# Patient Record
Sex: MALE | Race: WHITE | NOT HISPANIC OR LATINO | Employment: UNEMPLOYED | URBAN - METROPOLITAN AREA
[De-identification: names, ages, dates, MRNs, and addresses within clinical notes are randomized per-mention and may not be internally consistent; named-entity substitution may affect disease eponyms.]

---

## 2022-01-11 ENCOUNTER — HOSPITAL ENCOUNTER (INPATIENT)
Facility: HOSPITAL | Age: 42
LOS: 5 days | Discharge: HOME | End: 2022-01-19
Attending: EMERGENCY MEDICINE | Admitting: INTERNAL MEDICINE
Payer: MEDICAID

## 2022-01-11 DIAGNOSIS — L89.94 PRESSURE INJURY, STAGE 4, WITH INFECTION: Primary | ICD-10-CM

## 2022-01-11 DIAGNOSIS — L08.9 PRESSURE INJURY, STAGE 4, WITH INFECTION: Primary | ICD-10-CM

## 2022-01-11 DIAGNOSIS — M25.511 RIGHT SHOULDER PAIN, UNSPECIFIED CHRONICITY: ICD-10-CM

## 2022-01-11 DIAGNOSIS — L89.90 INFECTED DECUBITUS ULCER, UNSPECIFIED ULCER STAGE: ICD-10-CM

## 2022-01-11 DIAGNOSIS — L08.9 INFECTED DECUBITUS ULCER, UNSPECIFIED ULCER STAGE: ICD-10-CM

## 2022-01-11 LAB
ALBUMIN SERPL-MCNC: 2.9 G/DL (ref 3.5–5.2)
ALBUMIN/GLOB SERPL: 0.8 G/DL
ALP SERPL-CCNC: 74 U/L (ref 39–117)
ALT SERPL W P-5'-P-CCNC: 16 U/L (ref 1–41)
ANION GAP SERPL CALCULATED.3IONS-SCNC: 10 MMOL/L (ref 5–15)
AST SERPL-CCNC: 8 U/L (ref 1–40)
BILIRUB SERPL-MCNC: 0.2 MG/DL (ref 0–1.2)
BUN SERPL-MCNC: 20 MG/DL (ref 6–20)
BUN/CREAT SERPL: 30.3 (ref 7–25)
CALCIUM SPEC-SCNC: 9.6 MG/DL (ref 8.6–10.5)
CHLORIDE SERPL-SCNC: 101 MMOL/L (ref 98–107)
CO2 SERPL-SCNC: 26 MMOL/L (ref 22–29)
CREAT SERPL-MCNC: 0.66 MG/DL (ref 0.76–1.27)
DEPRECATED RDW RBC AUTO: 40.3 FL (ref 37–54)
EOSINOPHIL # BLD MANUAL: 0.22 10*3/MM3 (ref 0–0.4)
EOSINOPHIL NFR BLD MANUAL: 4 % (ref 0.3–6.2)
ERYTHROCYTE [DISTWIDTH] IN BLOOD BY AUTOMATED COUNT: 12.8 % (ref 12.3–15.4)
GFR SERPL CREATININE-BSD FRML MDRD: 133 ML/MIN/1.73
GLOBULIN UR ELPH-MCNC: 3.5 GM/DL
GLUCOSE SERPL-MCNC: 195 MG/DL (ref 65–99)
HCT VFR BLD AUTO: 32.5 % (ref 37.5–51)
HGB BLD-MCNC: 11.1 G/DL (ref 13–17.7)
LYMPHOCYTES # BLD MANUAL: 1.29 10*3/MM3 (ref 0.7–3.1)
LYMPHOCYTES NFR BLD MANUAL: 8 % (ref 5–12)
MCH RBC QN AUTO: 29.7 PG (ref 26.6–33)
MCHC RBC AUTO-ENTMCNC: 34.1 G/DL (ref 31.5–35.7)
MCV RBC AUTO: 87 FL (ref 79–97)
METAMYELOCYTES NFR BLD MANUAL: 2 % (ref 0–0)
MONOCYTES # BLD: 0.45 10*3/MM3 (ref 0.1–0.9)
NEUTROPHILS # BLD AUTO: 3.53 10*3/MM3 (ref 1.7–7)
NEUTROPHILS NFR BLD MANUAL: 53 % (ref 42.7–76)
NEUTS BAND NFR BLD MANUAL: 10 % (ref 0–5)
NEUTS VAC BLD QL SMEAR: ABNORMAL
PLAT MORPH BLD: NORMAL
PLATELET # BLD AUTO: 308 10*3/MM3 (ref 140–450)
PMV BLD AUTO: 6.6 FL (ref 6–12)
POTASSIUM SERPL-SCNC: 4.6 MMOL/L (ref 3.5–5.2)
PROT SERPL-MCNC: 6.4 G/DL (ref 6–8.5)
RBC # BLD AUTO: 3.73 10*6/MM3 (ref 4.14–5.8)
RBC MORPH BLD: NORMAL
SCAN SLIDE: NORMAL
SODIUM SERPL-SCNC: 137 MMOL/L (ref 136–145)
TOXIC GRANULATION: ABNORMAL
VARIANT LYMPHS NFR BLD MANUAL: 23 % (ref 19.6–45.3)
WBC NRBC COR # BLD: 5.6 10*3/MM3 (ref 3.4–10.8)

## 2022-01-11 PROCEDURE — 80053 COMPREHEN METABOLIC PANEL: CPT | Performed by: EMERGENCY MEDICINE

## 2022-01-11 PROCEDURE — 85025 COMPLETE CBC W/AUTO DIFF WBC: CPT | Performed by: EMERGENCY MEDICINE

## 2022-01-11 PROCEDURE — 99284 EMERGENCY DEPT VISIT MOD MDM: CPT

## 2022-01-11 PROCEDURE — 87040 BLOOD CULTURE FOR BACTERIA: CPT | Performed by: EMERGENCY MEDICINE

## 2022-01-11 PROCEDURE — 83036 HEMOGLOBIN GLYCOSYLATED A1C: CPT | Performed by: NURSE PRACTITIONER

## 2022-01-11 PROCEDURE — 87070 CULTURE OTHR SPECIMN AEROBIC: CPT | Performed by: EMERGENCY MEDICINE

## 2022-01-11 PROCEDURE — 87205 SMEAR GRAM STAIN: CPT | Performed by: EMERGENCY MEDICINE

## 2022-01-11 PROCEDURE — 25010000002 VANCOMYCIN 1 G RECONSTITUTED SOLUTION: Performed by: EMERGENCY MEDICINE

## 2022-01-11 PROCEDURE — 25010000002 PIPERACILLIN SOD-TAZOBACTAM PER 1 G: Performed by: EMERGENCY MEDICINE

## 2022-01-11 PROCEDURE — 85007 BL SMEAR W/DIFF WBC COUNT: CPT | Performed by: EMERGENCY MEDICINE

## 2022-01-11 PROCEDURE — 87147 CULTURE TYPE IMMUNOLOGIC: CPT | Performed by: EMERGENCY MEDICINE

## 2022-01-11 PROCEDURE — G0378 HOSPITAL OBSERVATION PER HR: HCPCS

## 2022-01-11 PROCEDURE — 25010000002 VANCOMYCIN 10 G RECONSTITUTED SOLUTION: Performed by: EMERGENCY MEDICINE

## 2022-01-11 RX ORDER — VANCOMYCIN 1.75 GRAM/500 ML IN 0.9 % SODIUM CHLORIDE INTRAVENOUS
20 ONCE
Status: COMPLETED | OUTPATIENT
Start: 2022-01-11 | End: 2022-01-11

## 2022-01-11 RX ORDER — SODIUM CHLORIDE 0.9 % (FLUSH) 0.9 %
10 SYRINGE (ML) INJECTION AS NEEDED
Status: DISCONTINUED | OUTPATIENT
Start: 2022-01-11 | End: 2022-01-19 | Stop reason: HOSPADM

## 2022-01-11 RX ORDER — HYDROCODONE BITARTRATE AND ACETAMINOPHEN 5; 325 MG/1; MG/1
1 TABLET ORAL EVERY 4 HOURS PRN
Status: COMPLETED | OUTPATIENT
Start: 2022-01-11 | End: 2022-01-11

## 2022-01-11 RX ORDER — HYDROCODONE BITARTRATE AND ACETAMINOPHEN 5; 325 MG/1; MG/1
1 TABLET ORAL EVERY 6 HOURS PRN
COMMUNITY
End: 2022-01-19

## 2022-01-11 RX ADMIN — VANCOMYCIN HYDROCHLORIDE 1750 MG: 1 INJECTION, POWDER, LYOPHILIZED, FOR SOLUTION INTRAVENOUS at 22:49

## 2022-01-11 RX ADMIN — HYDROCODONE BITARTRATE AND ACETAMINOPHEN 1 TABLET: 5; 325 TABLET ORAL at 21:46

## 2022-01-11 RX ADMIN — PIPERACILLIN SODIUM AND TAZOBACTAM SODIUM 3.38 G: 3; .375 INJECTION, POWDER, LYOPHILIZED, FOR SOLUTION INTRAVENOUS at 19:14

## 2022-01-12 ENCOUNTER — APPOINTMENT (OUTPATIENT)
Dept: GENERAL RADIOLOGY | Facility: HOSPITAL | Age: 42
End: 2022-01-12
Payer: MEDICAID

## 2022-01-12 ENCOUNTER — ANESTHESIA (OUTPATIENT)
Dept: PERIOP | Facility: HOSPITAL | Age: 42
End: 2022-01-12
Payer: MEDICAID

## 2022-01-12 ENCOUNTER — ANESTHESIA EVENT (OUTPATIENT)
Dept: PERIOP | Facility: HOSPITAL | Age: 42
End: 2022-01-12
Payer: MEDICAID

## 2022-01-12 ENCOUNTER — APPOINTMENT (OUTPATIENT)
Dept: WOUND CARE | Facility: HOSPITAL | Age: 42
End: 2022-01-12
Payer: MEDICAID

## 2022-01-12 LAB
ALBUMIN SERPL-MCNC: 2.9 G/DL (ref 3.5–5.2)
ALBUMIN/GLOB SERPL: 0.8 G/DL
ALP SERPL-CCNC: 74 U/L (ref 39–117)
ALT SERPL W P-5'-P-CCNC: 16 U/L (ref 1–41)
ANION GAP SERPL CALCULATED.3IONS-SCNC: 11 MMOL/L (ref 5–15)
AST SERPL-CCNC: 11 U/L (ref 1–40)
BILIRUB SERPL-MCNC: 0.2 MG/DL (ref 0–1.2)
BUN SERPL-MCNC: 15 MG/DL (ref 6–20)
BUN/CREAT SERPL: 19.7 (ref 7–25)
CALCIUM SPEC-SCNC: 9.5 MG/DL (ref 8.6–10.5)
CHLORIDE SERPL-SCNC: 103 MMOL/L (ref 98–107)
CK SERPL-CCNC: 24 U/L (ref 20–200)
CO2 SERPL-SCNC: 25 MMOL/L (ref 22–29)
CREAT SERPL-MCNC: 0.76 MG/DL (ref 0.76–1.27)
GFR SERPL CREATININE-BSD FRML MDRD: 113 ML/MIN/1.73
GLOBULIN UR ELPH-MCNC: 3.5 GM/DL
GLUCOSE BLDC GLUCOMTR-MCNC: 123 MG/DL (ref 70–105)
GLUCOSE BLDC GLUCOMTR-MCNC: 149 MG/DL (ref 70–105)
GLUCOSE BLDC GLUCOMTR-MCNC: 164 MG/DL (ref 70–105)
GLUCOSE BLDC GLUCOMTR-MCNC: 180 MG/DL (ref 70–105)
GLUCOSE BLDC GLUCOMTR-MCNC: 245 MG/DL (ref 70–105)
GLUCOSE SERPL-MCNC: 110 MG/DL (ref 65–99)
POTASSIUM SERPL-SCNC: 4.4 MMOL/L (ref 3.5–5.2)
PROT SERPL-MCNC: 6.4 G/DL (ref 6–8.5)
SODIUM SERPL-SCNC: 139 MMOL/L (ref 136–145)

## 2022-01-12 PROCEDURE — 63710000001 INSULIN LISPRO (HUMAN) PER 5 UNITS: Performed by: NURSE PRACTITIONER

## 2022-01-12 PROCEDURE — 99222 1ST HOSP IP/OBS MODERATE 55: CPT | Performed by: SURGERY

## 2022-01-12 PROCEDURE — 25010000002 PROPOFOL 10 MG/ML EMULSION: Performed by: ANESTHESIOLOGY

## 2022-01-12 PROCEDURE — 73030 X-RAY EXAM OF SHOULDER: CPT

## 2022-01-12 PROCEDURE — 25010000002 PIPERACILLIN SOD-TAZOBACTAM PER 1 G: Performed by: ANESTHESIOLOGY

## 2022-01-12 PROCEDURE — 25010000002 FENTANYL CITRATE (PF) 100 MCG/2ML SOLUTION: Performed by: ANESTHESIOLOGY

## 2022-01-12 PROCEDURE — 0KBN0ZZ EXCISION OF RIGHT HIP MUSCLE, OPEN APPROACH: ICD-10-PCS | Performed by: SURGERY

## 2022-01-12 PROCEDURE — 82962 GLUCOSE BLOOD TEST: CPT

## 2022-01-12 PROCEDURE — G0378 HOSPITAL OBSERVATION PER HR: HCPCS

## 2022-01-12 PROCEDURE — 0 MORPHINE SULFATE 4 MG/ML SOLUTION: Performed by: SURGERY

## 2022-01-12 PROCEDURE — 0HD8XZZ EXTRACTION OF BUTTOCK SKIN, EXTERNAL APPROACH: ICD-10-PCS | Performed by: SURGERY

## 2022-01-12 PROCEDURE — 25010000002 MORPHINE SULFATE (PF) 2 MG/ML SOLUTION: Performed by: NURSE PRACTITIONER

## 2022-01-12 PROCEDURE — 25010000002 ONDANSETRON PER 1 MG: Performed by: ANESTHESIOLOGY

## 2022-01-12 PROCEDURE — 25010000002 MIDAZOLAM PER 1 MG: Performed by: ANESTHESIOLOGY

## 2022-01-12 PROCEDURE — 0 MEPERIDINE PER 100 MG: Performed by: ANESTHESIOLOGY

## 2022-01-12 PROCEDURE — 25010000002 PIPERACILLIN SOD-TAZOBACTAM PER 1 G: Performed by: NURSE PRACTITIONER

## 2022-01-12 PROCEDURE — 11043 DBRDMT MUSC&/FSCA 1ST 20/<: CPT | Performed by: SURGERY

## 2022-01-12 PROCEDURE — 11046 DBRDMT MUSC&/FSCA EA ADDL: CPT | Performed by: SURGERY

## 2022-01-12 PROCEDURE — 82550 ASSAY OF CK (CPK): CPT | Performed by: NURSE PRACTITIONER

## 2022-01-12 PROCEDURE — 25010000002 KETOROLAC TROMETHAMINE PER 15 MG: Performed by: ANESTHESIOLOGY

## 2022-01-12 PROCEDURE — 80053 COMPREHEN METABOLIC PANEL: CPT | Performed by: NURSE PRACTITIONER

## 2022-01-12 PROCEDURE — 25010000002 DAPTOMYCIN PER 1 MG: Performed by: NURSE PRACTITIONER

## 2022-01-12 PROCEDURE — 87102 FUNGUS ISOLATION CULTURE: CPT | Performed by: INTERNAL MEDICINE

## 2022-01-12 PROCEDURE — 0KBP0ZZ EXCISION OF LEFT HIP MUSCLE, OPEN APPROACH: ICD-10-PCS | Performed by: SURGERY

## 2022-01-12 RX ORDER — FLUMAZENIL 0.1 MG/ML
0.1 INJECTION INTRAVENOUS AS NEEDED
Status: DISCONTINUED | OUTPATIENT
Start: 2022-01-12 | End: 2022-01-12 | Stop reason: HOSPADM

## 2022-01-12 RX ORDER — NICOTINE POLACRILEX 4 MG
15 LOZENGE BUCCAL
Status: DISCONTINUED | OUTPATIENT
Start: 2022-01-12 | End: 2022-01-19 | Stop reason: HOSPADM

## 2022-01-12 RX ORDER — INSULIN LISPRO 100 [IU]/ML
0-7 INJECTION, SOLUTION INTRAVENOUS; SUBCUTANEOUS
Status: DISCONTINUED | OUTPATIENT
Start: 2022-01-12 | End: 2022-01-19 | Stop reason: HOSPADM

## 2022-01-12 RX ORDER — MEPERIDINE HYDROCHLORIDE 25 MG/ML
12.5 INJECTION INTRAMUSCULAR; INTRAVENOUS; SUBCUTANEOUS
Status: DISCONTINUED | OUTPATIENT
Start: 2022-01-12 | End: 2022-01-12 | Stop reason: HOSPADM

## 2022-01-12 RX ORDER — DROPERIDOL 2.5 MG/ML
0.62 INJECTION, SOLUTION INTRAMUSCULAR; INTRAVENOUS ONCE AS NEEDED
Status: DISCONTINUED | OUTPATIENT
Start: 2022-01-12 | End: 2022-01-12 | Stop reason: HOSPADM

## 2022-01-12 RX ORDER — FENTANYL CITRATE 50 UG/ML
INJECTION, SOLUTION INTRAMUSCULAR; INTRAVENOUS AS NEEDED
Status: DISCONTINUED | OUTPATIENT
Start: 2022-01-12 | End: 2022-01-12 | Stop reason: SURG

## 2022-01-12 RX ORDER — NALOXONE HCL 0.4 MG/ML
0.4 VIAL (ML) INJECTION AS NEEDED
Status: DISCONTINUED | OUTPATIENT
Start: 2022-01-12 | End: 2022-01-12 | Stop reason: HOSPADM

## 2022-01-12 RX ORDER — ONDANSETRON 2 MG/ML
4 INJECTION INTRAMUSCULAR; INTRAVENOUS EVERY 6 HOURS PRN
Status: DISCONTINUED | OUTPATIENT
Start: 2022-01-12 | End: 2022-01-12 | Stop reason: SDUPTHER

## 2022-01-12 RX ORDER — PROMETHAZINE HYDROCHLORIDE 25 MG/1
25 TABLET ORAL ONCE AS NEEDED
Status: DISCONTINUED | OUTPATIENT
Start: 2022-01-12 | End: 2022-01-12 | Stop reason: HOSPADM

## 2022-01-12 RX ORDER — DIPHENHYDRAMINE HYDROCHLORIDE 50 MG/ML
12.5 INJECTION INTRAMUSCULAR; INTRAVENOUS
Status: DISCONTINUED | OUTPATIENT
Start: 2022-01-12 | End: 2022-01-12 | Stop reason: HOSPADM

## 2022-01-12 RX ORDER — ACETAMINOPHEN 650 MG/1
650 SUPPOSITORY RECTAL ONCE AS NEEDED
Status: DISCONTINUED | OUTPATIENT
Start: 2022-01-12 | End: 2022-01-12 | Stop reason: HOSPADM

## 2022-01-12 RX ORDER — ROCURONIUM BROMIDE 10 MG/ML
INJECTION, SOLUTION INTRAVENOUS AS NEEDED
Status: DISCONTINUED | OUTPATIENT
Start: 2022-01-12 | End: 2022-01-12 | Stop reason: SURG

## 2022-01-12 RX ORDER — INSULIN LISPRO 100 [IU]/ML
0-7 INJECTION, SOLUTION INTRAVENOUS; SUBCUTANEOUS AS NEEDED
Status: DISCONTINUED | OUTPATIENT
Start: 2022-01-12 | End: 2022-01-19 | Stop reason: HOSPADM

## 2022-01-12 RX ORDER — ONDANSETRON 4 MG/1
4 TABLET, FILM COATED ORAL EVERY 6 HOURS PRN
Status: DISCONTINUED | OUTPATIENT
Start: 2022-01-12 | End: 2022-01-12 | Stop reason: SDUPTHER

## 2022-01-12 RX ORDER — IPRATROPIUM BROMIDE AND ALBUTEROL SULFATE 2.5; .5 MG/3ML; MG/3ML
3 SOLUTION RESPIRATORY (INHALATION) ONCE AS NEEDED
Status: DISCONTINUED | OUTPATIENT
Start: 2022-01-12 | End: 2022-01-12 | Stop reason: HOSPADM

## 2022-01-12 RX ORDER — SODIUM CHLORIDE 9 MG/ML
INJECTION, SOLUTION INTRAVENOUS CONTINUOUS PRN
Status: DISCONTINUED | OUTPATIENT
Start: 2022-01-12 | End: 2022-01-12 | Stop reason: SURG

## 2022-01-12 RX ORDER — MORPHINE SULFATE 4 MG/ML
4 INJECTION, SOLUTION INTRAMUSCULAR; INTRAVENOUS
Status: DISCONTINUED | OUTPATIENT
Start: 2022-01-12 | End: 2022-01-19

## 2022-01-12 RX ORDER — OLANZAPINE 10 MG/2ML
1 INJECTION, POWDER, LYOPHILIZED, FOR SOLUTION INTRAMUSCULAR AS NEEDED
Status: DISCONTINUED | OUTPATIENT
Start: 2022-01-12 | End: 2022-01-19 | Stop reason: HOSPADM

## 2022-01-12 RX ORDER — HYDROMORPHONE HCL 110MG/55ML
1 PATIENT CONTROLLED ANALGESIA SYRINGE INTRAVENOUS
Status: DISCONTINUED | OUTPATIENT
Start: 2022-01-12 | End: 2022-01-12 | Stop reason: HOSPADM

## 2022-01-12 RX ORDER — KETAMINE HCL IN NACL, ISO-OSM 100MG/10ML
SYRINGE (ML) INJECTION AS NEEDED
Status: DISCONTINUED | OUTPATIENT
Start: 2022-01-12 | End: 2022-01-12 | Stop reason: SURG

## 2022-01-12 RX ORDER — ONDANSETRON 2 MG/ML
4 INJECTION INTRAMUSCULAR; INTRAVENOUS ONCE AS NEEDED
Status: DISCONTINUED | OUTPATIENT
Start: 2022-01-12 | End: 2022-01-12 | Stop reason: HOSPADM

## 2022-01-12 RX ORDER — LABETALOL HYDROCHLORIDE 5 MG/ML
5 INJECTION, SOLUTION INTRAVENOUS
Status: DISCONTINUED | OUTPATIENT
Start: 2022-01-12 | End: 2022-01-12 | Stop reason: HOSPADM

## 2022-01-12 RX ORDER — IBUPROFEN 600 MG/1
600 TABLET ORAL EVERY 6 HOURS PRN
COMMUNITY
End: 2022-01-19 | Stop reason: HOSPADM

## 2022-01-12 RX ORDER — MORPHINE SULFATE 2 MG/ML
2 INJECTION, SOLUTION INTRAMUSCULAR; INTRAVENOUS EVERY 4 HOURS PRN
Status: DISCONTINUED | OUTPATIENT
Start: 2022-01-12 | End: 2022-01-12 | Stop reason: ALTCHOICE

## 2022-01-12 RX ORDER — HYDROCODONE BITARTRATE AND ACETAMINOPHEN 5; 325 MG/1; MG/1
1 TABLET ORAL EVERY 6 HOURS PRN
Status: DISCONTINUED | OUTPATIENT
Start: 2022-01-12 | End: 2022-01-12

## 2022-01-12 RX ORDER — KETOROLAC TROMETHAMINE 30 MG/ML
INJECTION, SOLUTION INTRAMUSCULAR; INTRAVENOUS AS NEEDED
Status: DISCONTINUED | OUTPATIENT
Start: 2022-01-12 | End: 2022-01-12 | Stop reason: SURG

## 2022-01-12 RX ORDER — MIDAZOLAM HYDROCHLORIDE 1 MG/ML
1 INJECTION INTRAMUSCULAR; INTRAVENOUS
Status: DISCONTINUED | OUTPATIENT
Start: 2022-01-12 | End: 2022-01-12 | Stop reason: HOSPADM

## 2022-01-12 RX ORDER — FAMOTIDINE 20 MG/1
40 TABLET, FILM COATED ORAL DAILY
Status: DISCONTINUED | OUTPATIENT
Start: 2022-01-12 | End: 2022-01-19 | Stop reason: HOSPADM

## 2022-01-12 RX ORDER — SODIUM CHLORIDE, SODIUM LACTATE, POTASSIUM CHLORIDE, CALCIUM CHLORIDE 600; 310; 30; 20 MG/100ML; MG/100ML; MG/100ML; MG/100ML
INJECTION, SOLUTION INTRAVENOUS CONTINUOUS PRN
Status: DISCONTINUED | OUTPATIENT
Start: 2022-01-12 | End: 2022-01-12 | Stop reason: SURG

## 2022-01-12 RX ORDER — MIDAZOLAM HYDROCHLORIDE 1 MG/ML
INJECTION INTRAMUSCULAR; INTRAVENOUS AS NEEDED
Status: DISCONTINUED | OUTPATIENT
Start: 2022-01-12 | End: 2022-01-12 | Stop reason: SURG

## 2022-01-12 RX ORDER — ARGININE/GLUTAMINE/CALCIUM BMB 7G-7G-1.5G
1 POWDER IN PACKET (EA) ORAL 2 TIMES DAILY
Status: DISCONTINUED | OUTPATIENT
Start: 2022-01-12 | End: 2022-01-19 | Stop reason: HOSPADM

## 2022-01-12 RX ORDER — ONDANSETRON 4 MG/1
4 TABLET, FILM COATED ORAL EVERY 6 HOURS PRN
Status: DISCONTINUED | OUTPATIENT
Start: 2022-01-12 | End: 2022-01-19 | Stop reason: HOSPADM

## 2022-01-12 RX ORDER — LIDOCAINE HYDROCHLORIDE 20 MG/ML
INJECTION, SOLUTION EPIDURAL; INFILTRATION; INTRACAUDAL; PERINEURAL AS NEEDED
Status: DISCONTINUED | OUTPATIENT
Start: 2022-01-12 | End: 2022-01-12 | Stop reason: SURG

## 2022-01-12 RX ORDER — SODIUM CHLORIDE 0.9 % (FLUSH) 0.9 %
10 SYRINGE (ML) INJECTION EVERY 12 HOURS SCHEDULED
Status: DISCONTINUED | OUTPATIENT
Start: 2022-01-12 | End: 2022-01-19 | Stop reason: HOSPADM

## 2022-01-12 RX ORDER — SODIUM CHLORIDE 0.9 % (FLUSH) 0.9 %
3 SYRINGE (ML) INJECTION EVERY 12 HOURS SCHEDULED
Status: DISCONTINUED | OUTPATIENT
Start: 2022-01-12 | End: 2022-01-19 | Stop reason: HOSPADM

## 2022-01-12 RX ORDER — SODIUM CHLORIDE 0.9 % (FLUSH) 0.9 %
3-10 SYRINGE (ML) INJECTION AS NEEDED
Status: DISCONTINUED | OUTPATIENT
Start: 2022-01-12 | End: 2022-01-19 | Stop reason: HOSPADM

## 2022-01-12 RX ORDER — PROMETHAZINE HYDROCHLORIDE 25 MG/1
25 SUPPOSITORY RECTAL ONCE AS NEEDED
Status: DISCONTINUED | OUTPATIENT
Start: 2022-01-12 | End: 2022-01-12 | Stop reason: HOSPADM

## 2022-01-12 RX ORDER — PROPOFOL 10 MG/ML
VIAL (ML) INTRAVENOUS AS NEEDED
Status: DISCONTINUED | OUTPATIENT
Start: 2022-01-12 | End: 2022-01-12 | Stop reason: SURG

## 2022-01-12 RX ORDER — PIPERACILLIN SODIUM, TAZOBACTAM SODIUM 3; .375 G/15ML; G/15ML
INJECTION, POWDER, LYOPHILIZED, FOR SOLUTION INTRAVENOUS AS NEEDED
Status: DISCONTINUED | OUTPATIENT
Start: 2022-01-12 | End: 2022-01-12 | Stop reason: SURG

## 2022-01-12 RX ORDER — DEXTROSE MONOHYDRATE 25 G/50ML
25 INJECTION, SOLUTION INTRAVENOUS
Status: DISCONTINUED | OUTPATIENT
Start: 2022-01-12 | End: 2022-01-19 | Stop reason: HOSPADM

## 2022-01-12 RX ORDER — SODIUM CHLORIDE 0.9 % (FLUSH) 0.9 %
10 SYRINGE (ML) INJECTION AS NEEDED
Status: DISCONTINUED | OUTPATIENT
Start: 2022-01-12 | End: 2022-01-19 | Stop reason: HOSPADM

## 2022-01-12 RX ORDER — ACETAMINOPHEN 500 MG
1000 TABLET ORAL ONCE AS NEEDED
Status: DISCONTINUED | OUTPATIENT
Start: 2022-01-12 | End: 2022-01-12 | Stop reason: HOSPADM

## 2022-01-12 RX ORDER — HYDROMORPHONE HCL 110MG/55ML
0.5 PATIENT CONTROLLED ANALGESIA SYRINGE INTRAVENOUS
Status: DISCONTINUED | OUTPATIENT
Start: 2022-01-12 | End: 2022-01-12 | Stop reason: HOSPADM

## 2022-01-12 RX ORDER — ONDANSETRON 2 MG/ML
INJECTION INTRAMUSCULAR; INTRAVENOUS AS NEEDED
Status: DISCONTINUED | OUTPATIENT
Start: 2022-01-12 | End: 2022-01-12 | Stop reason: SURG

## 2022-01-12 RX ORDER — OXYCODONE HYDROCHLORIDE 5 MG/1
10 TABLET ORAL EVERY 4 HOURS PRN
Status: DISCONTINUED | OUTPATIENT
Start: 2022-01-12 | End: 2022-01-12 | Stop reason: HOSPADM

## 2022-01-12 RX ORDER — ACETAMINOPHEN 325 MG/1
650 TABLET ORAL EVERY 4 HOURS PRN
Status: DISCONTINUED | OUTPATIENT
Start: 2022-01-12 | End: 2022-01-19 | Stop reason: HOSPADM

## 2022-01-12 RX ORDER — OXYCODONE HYDROCHLORIDE 5 MG/1
5 TABLET ORAL ONCE AS NEEDED
Status: DISCONTINUED | OUTPATIENT
Start: 2022-01-12 | End: 2022-01-12 | Stop reason: HOSPADM

## 2022-01-12 RX ORDER — HYDROCODONE BITARTRATE AND ACETAMINOPHEN 10; 325 MG/1; MG/1
1 TABLET ORAL EVERY 4 HOURS PRN
Status: DISPENSED | OUTPATIENT
Start: 2022-01-12 | End: 2022-01-19

## 2022-01-12 RX ORDER — ONDANSETRON 2 MG/ML
4 INJECTION INTRAMUSCULAR; INTRAVENOUS EVERY 6 HOURS PRN
Status: DISCONTINUED | OUTPATIENT
Start: 2022-01-12 | End: 2022-01-19 | Stop reason: HOSPADM

## 2022-01-12 RX ADMIN — ROCURONIUM BROMIDE 30 MG: 50 INJECTION, SOLUTION INTRAVENOUS at 19:19

## 2022-01-12 RX ADMIN — FENTANYL CITRATE 100 MCG: 0.05 INJECTION, SOLUTION INTRAMUSCULAR; INTRAVENOUS at 19:16

## 2022-01-12 RX ADMIN — INSULIN LISPRO 2 UNITS: 100 INJECTION, SOLUTION INTRAVENOUS; SUBCUTANEOUS at 17:06

## 2022-01-12 RX ADMIN — PROPOFOL 150 MG: 10 INJECTION, EMULSION INTRAVENOUS at 19:19

## 2022-01-12 RX ADMIN — PIPERACILLIN SODIUM AND TAZOBACTAM SODIUM 3.38 G: 3; .375 INJECTION, POWDER, LYOPHILIZED, FOR SOLUTION INTRAVENOUS at 19:43

## 2022-01-12 RX ADMIN — Medication 15 MG: at 20:28

## 2022-01-12 RX ADMIN — SODIUM CHLORIDE, PRESERVATIVE FREE 10 ML: 5 INJECTION INTRAVENOUS at 23:18

## 2022-01-12 RX ADMIN — SODIUM CHLORIDE, PRESERVATIVE FREE 10 ML: 5 INJECTION INTRAVENOUS at 01:08

## 2022-01-12 RX ADMIN — SODIUM CHLORIDE, PRESERVATIVE FREE 3 ML: 5 INJECTION INTRAVENOUS at 21:00

## 2022-01-12 RX ADMIN — HYDROCODONE BITARTRATE AND ACETAMINOPHEN 1 TABLET: 10; 325 TABLET ORAL at 10:16

## 2022-01-12 RX ADMIN — HYDROCODONE BITARTRATE AND ACETAMINOPHEN 1 TABLET: 10; 325 TABLET ORAL at 14:43

## 2022-01-12 RX ADMIN — MORPHINE SULFATE 4 MG: 4 INJECTION INTRAVENOUS at 23:23

## 2022-01-12 RX ADMIN — HYDROCODONE BITARTRATE AND ACETAMINOPHEN 1 TABLET: 10; 325 TABLET ORAL at 06:33

## 2022-01-12 RX ADMIN — SODIUM CHLORIDE: 0.9 INJECTION, SOLUTION INTRAVENOUS at 19:43

## 2022-01-12 RX ADMIN — Medication 25 MG: at 19:50

## 2022-01-12 RX ADMIN — PIPERACILLIN SODIUM,TAZOBACTAM SODIUM 3.38 G: 3; .375 INJECTION, POWDER, FOR SOLUTION INTRAVENOUS at 12:37

## 2022-01-12 RX ADMIN — FAMOTIDINE 40 MG: 20 TABLET ORAL at 08:35

## 2022-01-12 RX ADMIN — LIDOCAINE HYDROCHLORIDE 100 MG: 20 INJECTION, SOLUTION EPIDURAL; INFILTRATION; INTRACAUDAL; PERINEURAL at 19:19

## 2022-01-12 RX ADMIN — DAPTOMYCIN 500 MG: 500 INJECTION, POWDER, LYOPHILIZED, FOR SOLUTION INTRAVENOUS at 15:50

## 2022-01-12 RX ADMIN — Medication 10 MG: at 20:23

## 2022-01-12 RX ADMIN — SODIUM CHLORIDE, PRESERVATIVE FREE 10 ML: 5 INJECTION INTRAVENOUS at 09:10

## 2022-01-12 RX ADMIN — MORPHINE SULFATE 2 MG: 2 INJECTION, SOLUTION INTRAMUSCULAR; INTRAVENOUS at 12:16

## 2022-01-12 RX ADMIN — KETOROLAC TROMETHAMINE 30 MG: 30 INJECTION, SOLUTION INTRAMUSCULAR at 20:05

## 2022-01-12 RX ADMIN — ONDANSETRON 4 MG: 2 INJECTION INTRAMUSCULAR; INTRAVENOUS at 20:05

## 2022-01-12 RX ADMIN — SODIUM CHLORIDE, PRESERVATIVE FREE 3 ML: 5 INJECTION INTRAVENOUS at 11:41

## 2022-01-12 RX ADMIN — MIDAZOLAM 2 MG: 1 INJECTION INTRAMUSCULAR; INTRAVENOUS at 19:16

## 2022-01-12 RX ADMIN — SODIUM CHLORIDE, SODIUM LACTATE, POTASSIUM CHLORIDE, AND CALCIUM CHLORIDE: .6; .31; .03; .02 INJECTION, SOLUTION INTRAVENOUS at 19:14

## 2022-01-12 RX ADMIN — INSULIN LISPRO 3 UNITS: 100 INJECTION, SOLUTION INTRAVENOUS; SUBCUTANEOUS at 12:17

## 2022-01-12 RX ADMIN — MEPERIDINE HYDROCHLORIDE 25 MG: 25 INJECTION INTRAMUSCULAR; INTRAVENOUS; SUBCUTANEOUS at 20:47

## 2022-01-12 RX ADMIN — HYDROCODONE BITARTRATE AND ACETAMINOPHEN 1 TABLET: 5; 325 TABLET ORAL at 02:40

## 2022-01-12 NOTE — ANESTHESIA PREPROCEDURE EVALUATION
Anesthesia Evaluation     Patient summary reviewed and Nursing notes reviewed   no history of anesthetic complications:  NPO Solid Status: > 8 hours  NPO Liquid Status: > 8 hours           Airway   Dental      Pulmonary    Cardiovascular         Neuro/Psych  GI/Hepatic/Renal/Endo    (+)   diabetes mellitus,     Musculoskeletal     Abdominal    Substance History      OB/GYN          Other   blood dyscrasia anemia,     ROS/Med Hx Other: Low albumin, infected pressure wound                  Anesthesia Plan    ASA 3 - emergent     general   (Patient identified; pre-operative vital signs, all relevant labs/studies, complete medical/surgical/anesthetic history, full medication list, full allergy list, and NPO status obtained/reviewed; physical assessment performed; anesthetic options, side effects, potential complications, risks, and benefits discussed; questions answered; written anesthesia consent obtained; patient cleared for procedure; anesthesia machine and equipment checked and functioning)  intravenous induction     Anesthetic plan, all risks, benefits, and alternatives have been provided, discussed and informed consent has been obtained with: patient.

## 2022-01-13 LAB
ALBUMIN SERPL-MCNC: 2.4 G/DL (ref 3.5–5.2)
ALBUMIN/GLOB SERPL: 0.8 G/DL
ALP SERPL-CCNC: 66 U/L (ref 39–117)
ALT SERPL W P-5'-P-CCNC: 12 U/L (ref 1–41)
ANION GAP SERPL CALCULATED.3IONS-SCNC: 10 MMOL/L (ref 5–15)
AST SERPL-CCNC: 11 U/L (ref 1–40)
BILIRUB SERPL-MCNC: 0.3 MG/DL (ref 0–1.2)
BUN SERPL-MCNC: 17 MG/DL (ref 6–20)
BUN/CREAT SERPL: 23 (ref 7–25)
CALCIUM SPEC-SCNC: 8.6 MG/DL (ref 8.6–10.5)
CHLORIDE SERPL-SCNC: 103 MMOL/L (ref 98–107)
CO2 SERPL-SCNC: 25 MMOL/L (ref 22–29)
CREAT SERPL-MCNC: 0.74 MG/DL (ref 0.76–1.27)
DEPRECATED RDW RBC AUTO: 39.4 FL (ref 37–54)
EOSINOPHIL # BLD MANUAL: 0.17 10*3/MM3 (ref 0–0.4)
EOSINOPHIL NFR BLD MANUAL: 3 % (ref 0.3–6.2)
ERYTHROCYTE [DISTWIDTH] IN BLOOD BY AUTOMATED COUNT: 12.7 % (ref 12.3–15.4)
GFR SERPL CREATININE-BSD FRML MDRD: 117 ML/MIN/1.73
GLOBULIN UR ELPH-MCNC: 3.2 GM/DL
GLUCOSE BLDC GLUCOMTR-MCNC: 191 MG/DL (ref 70–105)
GLUCOSE BLDC GLUCOMTR-MCNC: 215 MG/DL (ref 70–105)
GLUCOSE BLDC GLUCOMTR-MCNC: 231 MG/DL (ref 70–105)
GLUCOSE BLDC GLUCOMTR-MCNC: 239 MG/DL (ref 70–105)
GLUCOSE SERPL-MCNC: 176 MG/DL (ref 65–99)
HBA1C MFR BLD: 9.7 % (ref 3.5–5.6)
HCT VFR BLD AUTO: 30.7 % (ref 37.5–51)
HGB BLD-MCNC: 10.6 G/DL (ref 13–17.7)
LYMPHOCYTES # BLD MANUAL: 1.37 10*3/MM3 (ref 0.7–3.1)
LYMPHOCYTES NFR BLD MANUAL: 4 % (ref 5–12)
MCH RBC QN AUTO: 30.6 PG (ref 26.6–33)
MCHC RBC AUTO-ENTMCNC: 34.4 G/DL (ref 31.5–35.7)
MCV RBC AUTO: 89 FL (ref 79–97)
MONOCYTES # BLD: 0.23 10*3/MM3 (ref 0.1–0.9)
NEUTROPHILS # BLD AUTO: 3.93 10*3/MM3 (ref 1.7–7)
NEUTROPHILS NFR BLD MANUAL: 54 % (ref 42.7–76)
NEUTS BAND NFR BLD MANUAL: 15 % (ref 0–5)
PLATELET # BLD AUTO: 295 10*3/MM3 (ref 140–450)
PMV BLD AUTO: 7.3 FL (ref 6–12)
POTASSIUM SERPL-SCNC: 4.8 MMOL/L (ref 3.5–5.2)
PROT SERPL-MCNC: 5.6 G/DL (ref 6–8.5)
RBC # BLD AUTO: 3.45 10*6/MM3 (ref 4.14–5.8)
RBC MORPH BLD: NORMAL
SCAN SLIDE: NORMAL
SMALL PLATELETS BLD QL SMEAR: ADEQUATE
SODIUM SERPL-SCNC: 138 MMOL/L (ref 136–145)
VARIANT LYMPHS NFR BLD MANUAL: 24 % (ref 19.6–45.3)
WBC MORPH BLD: NORMAL
WBC NRBC COR # BLD: 5.7 10*3/MM3 (ref 3.4–10.8)

## 2022-01-13 PROCEDURE — 25010000002 PIPERACILLIN SOD-TAZOBACTAM PER 1 G: Performed by: SURGERY

## 2022-01-13 PROCEDURE — 85007 BL SMEAR W/DIFF WBC COUNT: CPT | Performed by: SURGERY

## 2022-01-13 PROCEDURE — 80053 COMPREHEN METABOLIC PANEL: CPT | Performed by: SURGERY

## 2022-01-13 PROCEDURE — 85025 COMPLETE CBC W/AUTO DIFF WBC: CPT | Performed by: SURGERY

## 2022-01-13 PROCEDURE — 63710000001 INSULIN LISPRO (HUMAN) PER 5 UNITS: Performed by: SURGERY

## 2022-01-13 PROCEDURE — 25010000002 DAPTOMYCIN PER 1 MG: Performed by: SURGERY

## 2022-01-13 PROCEDURE — 63710000001 INSULIN GLARGINE PER 5 UNITS: Performed by: INTERNAL MEDICINE

## 2022-01-13 PROCEDURE — 97166 OT EVAL MOD COMPLEX 45 MIN: CPT

## 2022-01-13 PROCEDURE — 0 MORPHINE SULFATE 4 MG/ML SOLUTION: Performed by: SURGERY

## 2022-01-13 PROCEDURE — 97161 PT EVAL LOW COMPLEX 20 MIN: CPT

## 2022-01-13 PROCEDURE — 99231 SBSQ HOSP IP/OBS SF/LOW 25: CPT | Performed by: SURGERY

## 2022-01-13 PROCEDURE — G0378 HOSPITAL OBSERVATION PER HR: HCPCS

## 2022-01-13 PROCEDURE — 82962 GLUCOSE BLOOD TEST: CPT

## 2022-01-13 RX ADMIN — INSULIN LISPRO 3 UNITS: 100 INJECTION, SOLUTION INTRAVENOUS; SUBCUTANEOUS at 11:32

## 2022-01-13 RX ADMIN — MORPHINE SULFATE 4 MG: 4 INJECTION INTRAVENOUS at 04:11

## 2022-01-13 RX ADMIN — MORPHINE SULFATE 4 MG: 4 INJECTION INTRAVENOUS at 21:10

## 2022-01-13 RX ADMIN — INSULIN LISPRO 2 UNITS: 100 INJECTION, SOLUTION INTRAVENOUS; SUBCUTANEOUS at 09:01

## 2022-01-13 RX ADMIN — SODIUM CHLORIDE, PRESERVATIVE FREE 10 ML: 5 INJECTION INTRAVENOUS at 21:12

## 2022-01-13 RX ADMIN — Medication 1 PACKET: at 16:15

## 2022-01-13 RX ADMIN — INSULIN GLARGINE 15 UNITS: 100 INJECTION, SOLUTION SUBCUTANEOUS at 21:10

## 2022-01-13 RX ADMIN — PIPERACILLIN AND TAZOBACTAM 3.38 G: 3; .375 INJECTION, POWDER, FOR SOLUTION INTRAVENOUS at 11:32

## 2022-01-13 RX ADMIN — MORPHINE SULFATE 4 MG: 4 INJECTION INTRAVENOUS at 11:32

## 2022-01-13 RX ADMIN — SODIUM CHLORIDE, PRESERVATIVE FREE 3 ML: 5 INJECTION INTRAVENOUS at 09:01

## 2022-01-13 RX ADMIN — SODIUM CHLORIDE, PRESERVATIVE FREE 10 ML: 5 INJECTION INTRAVENOUS at 09:02

## 2022-01-13 RX ADMIN — DAPTOMYCIN 500 MG: 500 INJECTION, POWDER, LYOPHILIZED, FOR SOLUTION INTRAVENOUS at 16:14

## 2022-01-13 RX ADMIN — SODIUM CHLORIDE, PRESERVATIVE FREE 3 ML: 5 INJECTION INTRAVENOUS at 21:13

## 2022-01-13 RX ADMIN — HYDROCODONE BITARTRATE AND ACETAMINOPHEN 1 TABLET: 10; 325 TABLET ORAL at 09:01

## 2022-01-13 RX ADMIN — PIPERACILLIN AND TAZOBACTAM 3.38 G: 3; .375 INJECTION, POWDER, FOR SOLUTION INTRAVENOUS at 01:51

## 2022-01-13 RX ADMIN — INSULIN LISPRO 3 UNITS: 100 INJECTION, SOLUTION INTRAVENOUS; SUBCUTANEOUS at 17:44

## 2022-01-13 RX ADMIN — MORPHINE SULFATE 4 MG: 4 INJECTION INTRAVENOUS at 16:14

## 2022-01-13 RX ADMIN — FAMOTIDINE 40 MG: 20 TABLET ORAL at 09:01

## 2022-01-13 RX ADMIN — PIPERACILLIN AND TAZOBACTAM 3.38 G: 3; .375 INJECTION, POWDER, FOR SOLUTION INTRAVENOUS at 17:44

## 2022-01-13 NOTE — ANESTHESIA PROCEDURE NOTES
Airway  Urgency: elective    Date/Time: 1/12/2022 7:21 PM  Airway not difficult    General Information and Staff    Patient location during procedure: OR  Anesthesiologist: Matteo Marques MD    Indications and Patient Condition  Indications for airway management: airway protection    Preoxygenated: yes  MILS not maintained throughout  Mask difficulty assessment: 1 - vent by mask    Final Airway Details  Final airway type: endotracheal airway      Successful airway: ETT  Cuffed: yes   Successful intubation technique: direct laryngoscopy  Endotracheal tube insertion site: oral  Blade: Niyah  Blade size: 4  ETT size (mm): 8.0  Cormack-Lehane Classification: grade I - full view of glottis  Placement verified by: capnometry and palpation of cuff   Measured from: lips  ETT/EBT  to lips (cm): 234  Number of attempts at approach: 1  Assessment: lips, teeth, and gum same as pre-op and atraumatic intubation    Additional Comments  ASA monitors applied; preoxygenated with 100% FiO2 via anesthesia face mask; induction of general anesthesia; bag-mask ventilation; patient's position optimized; laryngoscopy; cuffed ETT lubricated with lidocaine jelly and placed into the trachea; cuff inflated to seal with minimally occlusive airway cuff pressure; ETT connected to anesthesia circuit; atraumatic/dentition in preoperative condition; ETT secured in place; correct placement in the trachea confirmed by bilateral chest rise, tube condensation, and return of EtCO2 > 30 mmHg x3

## 2022-01-13 NOTE — ANESTHESIA POSTPROCEDURE EVALUATION
Patient: Richard Myers    Procedure Summary     Date: 01/12/22 Room / Location: [facility] OR 07 / [facility] MAIN OR    Anesthesia Start: 1914 Anesthesia Stop: 2031    Procedure: DEBRIDEMENT WOUND, decubitus (N/A Buttocks) Diagnosis:     Surgeons: Lacho Varghese MD Provider: Matteo Marques MD    Anesthesia Type: general ASA Status: 3 - Emergent          Anesthesia Type: general    Vitals  Vitals Value Taken Time   /86 01/12/22 2031   Temp     Pulse 87 01/12/22 2031   Resp     SpO2 99 % 01/12/22 2031   Vitals shown include unvalidated device data.        Post Anesthesia Care and Evaluation    Patient location during evaluation: PACU  Patient participation: complete - patient participated  Level of consciousness: awake  Pain scale: See nurse's notes for pain score.  Pain management: adequate  Airway patency: patent  Anesthetic complications: No anesthetic complications  PONV Status: none  Cardiovascular status: acceptable  Respiratory status: acceptable  Hydration status: acceptable    Comments: Patient seen and examined postoperatively; vital signs stable; SpO2 greater than or equal to 90%; cardiopulmonary status stable; nausea/vomiting adequately controlled; pain adequately controlled; no apparent anesthesia complications; patient discharged from anesthesia care when discharge criteria were met

## 2022-01-14 LAB
ALBUMIN SERPL-MCNC: 2.5 G/DL (ref 3.5–5.2)
ALBUMIN/GLOB SERPL: 0.8 G/DL
ALP SERPL-CCNC: 65 U/L (ref 39–117)
ALT SERPL W P-5'-P-CCNC: 11 U/L (ref 1–41)
ANION GAP SERPL CALCULATED.3IONS-SCNC: 9 MMOL/L (ref 5–15)
ANISOCYTOSIS BLD QL: ABNORMAL
AST SERPL-CCNC: 9 U/L (ref 1–40)
BACTERIA SPEC AEROBE CULT: ABNORMAL
BASOPHILS # BLD MANUAL: 0.06 10*3/MM3 (ref 0–0.2)
BASOPHILS NFR BLD MANUAL: 1 % (ref 0–1.5)
BILIRUB SERPL-MCNC: 0.2 MG/DL (ref 0–1.2)
BUN SERPL-MCNC: 17 MG/DL (ref 6–20)
BUN/CREAT SERPL: 21.3 (ref 7–25)
CALCIUM SPEC-SCNC: 8.7 MG/DL (ref 8.6–10.5)
CHLORIDE SERPL-SCNC: 100 MMOL/L (ref 98–107)
CO2 SERPL-SCNC: 29 MMOL/L (ref 22–29)
CREAT SERPL-MCNC: 0.8 MG/DL (ref 0.76–1.27)
DEPRECATED RDW RBC AUTO: 38.1 FL (ref 37–54)
EOSINOPHIL # BLD MANUAL: 0.12 10*3/MM3 (ref 0–0.4)
EOSINOPHIL NFR BLD MANUAL: 2 % (ref 0.3–6.2)
ERYTHROCYTE [DISTWIDTH] IN BLOOD BY AUTOMATED COUNT: 12.3 % (ref 12.3–15.4)
GFR SERPL CREATININE-BSD FRML MDRD: 107 ML/MIN/1.73
GLOBULIN UR ELPH-MCNC: 3.1 GM/DL
GLUCOSE BLDC GLUCOMTR-MCNC: 150 MG/DL (ref 70–105)
GLUCOSE BLDC GLUCOMTR-MCNC: 185 MG/DL (ref 70–105)
GLUCOSE BLDC GLUCOMTR-MCNC: 199 MG/DL (ref 70–105)
GLUCOSE BLDC GLUCOMTR-MCNC: 218 MG/DL (ref 70–105)
GLUCOSE SERPL-MCNC: 209 MG/DL (ref 65–99)
GRAM STN SPEC: ABNORMAL
HCT VFR BLD AUTO: 29.3 % (ref 37.5–51)
HGB BLD-MCNC: 10.2 G/DL (ref 13–17.7)
LYMPHOCYTES # BLD MANUAL: 1.24 10*3/MM3 (ref 0.7–3.1)
LYMPHOCYTES NFR BLD MANUAL: 7 % (ref 5–12)
MCH RBC QN AUTO: 30.6 PG (ref 26.6–33)
MCHC RBC AUTO-ENTMCNC: 34.9 G/DL (ref 31.5–35.7)
MCV RBC AUTO: 87.6 FL (ref 79–97)
METAMYELOCYTES NFR BLD MANUAL: 3 % (ref 0–0)
MONOCYTES # BLD: 0.41 10*3/MM3 (ref 0.1–0.9)
MYELOCYTES NFR BLD MANUAL: 3 % (ref 0–0)
NEUTROPHILS # BLD AUTO: 3.72 10*3/MM3 (ref 1.7–7)
NEUTROPHILS NFR BLD MANUAL: 48 % (ref 42.7–76)
NEUTS BAND NFR BLD MANUAL: 15 % (ref 0–5)
NEUTS VAC BLD QL SMEAR: ABNORMAL
PLAT MORPH BLD: NORMAL
PLATELET # BLD AUTO: 322 10*3/MM3 (ref 140–450)
PMV BLD AUTO: 7 FL (ref 6–12)
POTASSIUM SERPL-SCNC: 4.4 MMOL/L (ref 3.5–5.2)
PROT SERPL-MCNC: 5.6 G/DL (ref 6–8.5)
RBC # BLD AUTO: 3.34 10*6/MM3 (ref 4.14–5.8)
SCAN SLIDE: NORMAL
SODIUM SERPL-SCNC: 138 MMOL/L (ref 136–145)
TOXIC GRANULATION: ABNORMAL
VARIANT LYMPHS NFR BLD MANUAL: 21 % (ref 19.6–45.3)
WBC NRBC COR # BLD: 5.9 10*3/MM3 (ref 3.4–10.8)

## 2022-01-14 PROCEDURE — 63710000001 INSULIN GLARGINE PER 5 UNITS: Performed by: INTERNAL MEDICINE

## 2022-01-14 PROCEDURE — 25010000002 PIPERACILLIN SOD-TAZOBACTAM PER 1 G: Performed by: SURGERY

## 2022-01-14 PROCEDURE — 82962 GLUCOSE BLOOD TEST: CPT

## 2022-01-14 PROCEDURE — 63710000001 INSULIN LISPRO (HUMAN) PER 5 UNITS: Performed by: SURGERY

## 2022-01-14 PROCEDURE — 99231 SBSQ HOSP IP/OBS SF/LOW 25: CPT | Performed by: SURGERY

## 2022-01-14 PROCEDURE — 0 MORPHINE SULFATE 4 MG/ML SOLUTION: Performed by: SURGERY

## 2022-01-14 RX ORDER — POLYETHYLENE GLYCOL 3350 17 G/17G
17 POWDER, FOR SOLUTION ORAL 2 TIMES DAILY PRN
Status: DISCONTINUED | OUTPATIENT
Start: 2022-01-14 | End: 2022-01-19 | Stop reason: HOSPADM

## 2022-01-14 RX ADMIN — HYDROCODONE BITARTRATE AND ACETAMINOPHEN 1 TABLET: 10; 325 TABLET ORAL at 08:54

## 2022-01-14 RX ADMIN — SODIUM CHLORIDE, PRESERVATIVE FREE 10 ML: 5 INJECTION INTRAVENOUS at 22:00

## 2022-01-14 RX ADMIN — INSULIN LISPRO 2 UNITS: 100 INJECTION, SOLUTION INTRAVENOUS; SUBCUTANEOUS at 18:03

## 2022-01-14 RX ADMIN — MORPHINE SULFATE 4 MG: 4 INJECTION INTRAVENOUS at 02:19

## 2022-01-14 RX ADMIN — COLLAGENASE SANTYL 1 APPLICATION: 250 OINTMENT TOPICAL at 11:00

## 2022-01-14 RX ADMIN — Medication 1 PACKET: at 22:00

## 2022-01-14 RX ADMIN — PIPERACILLIN AND TAZOBACTAM 3.38 G: 3; .375 INJECTION, POWDER, FOR SOLUTION INTRAVENOUS at 09:00

## 2022-01-14 RX ADMIN — PIPERACILLIN AND TAZOBACTAM 3.38 G: 3; .375 INJECTION, POWDER, FOR SOLUTION INTRAVENOUS at 18:04

## 2022-01-14 RX ADMIN — POLYETHYLENE GLYCOL 3350 17 G: 17 POWDER, FOR SOLUTION ORAL at 16:13

## 2022-01-14 RX ADMIN — INSULIN GLARGINE 15 UNITS: 100 INJECTION, SOLUTION SUBCUTANEOUS at 22:00

## 2022-01-14 RX ADMIN — SODIUM CHLORIDE, PRESERVATIVE FREE 3 ML: 5 INJECTION INTRAVENOUS at 08:53

## 2022-01-14 RX ADMIN — MORPHINE SULFATE 4 MG: 4 INJECTION INTRAVENOUS at 18:04

## 2022-01-14 RX ADMIN — SODIUM CHLORIDE, PRESERVATIVE FREE 10 ML: 5 INJECTION INTRAVENOUS at 08:53

## 2022-01-14 RX ADMIN — Medication 1 PACKET: at 08:53

## 2022-01-14 RX ADMIN — SODIUM CHLORIDE, PRESERVATIVE FREE 3 ML: 5 INJECTION INTRAVENOUS at 22:00

## 2022-01-14 RX ADMIN — Medication 1 PACKET: at 02:05

## 2022-01-14 RX ADMIN — PIPERACILLIN AND TAZOBACTAM 3.38 G: 3; .375 INJECTION, POWDER, FOR SOLUTION INTRAVENOUS at 02:06

## 2022-01-14 RX ADMIN — MORPHINE SULFATE 4 MG: 4 INJECTION INTRAVENOUS at 22:00

## 2022-01-14 RX ADMIN — HYDROCODONE BITARTRATE AND ACETAMINOPHEN 1 TABLET: 10; 325 TABLET ORAL at 12:55

## 2022-01-14 RX ADMIN — FAMOTIDINE 40 MG: 20 TABLET ORAL at 08:53

## 2022-01-14 RX ADMIN — INSULIN LISPRO 2 UNITS: 100 INJECTION, SOLUTION INTRAVENOUS; SUBCUTANEOUS at 08:53

## 2022-01-14 RX ADMIN — MORPHINE SULFATE 4 MG: 4 INJECTION INTRAVENOUS at 05:30

## 2022-01-14 RX ADMIN — INSULIN LISPRO 2 UNITS: 100 INJECTION, SOLUTION INTRAVENOUS; SUBCUTANEOUS at 12:29

## 2022-01-15 ENCOUNTER — APPOINTMENT (OUTPATIENT)
Dept: MRI IMAGING | Facility: HOSPITAL | Age: 42
End: 2022-01-15
Payer: MEDICAID

## 2022-01-15 PROBLEM — E44.0 MODERATE MALNUTRITION: Status: ACTIVE | Noted: 2022-01-15

## 2022-01-15 LAB
ALBUMIN SERPL-MCNC: 2.7 G/DL (ref 3.5–5.2)
ALBUMIN/GLOB SERPL: 0.8 G/DL
ALP SERPL-CCNC: 69 U/L (ref 39–117)
ALT SERPL W P-5'-P-CCNC: 13 U/L (ref 1–41)
ANION GAP SERPL CALCULATED.3IONS-SCNC: 9 MMOL/L (ref 5–15)
AST SERPL-CCNC: 10 U/L (ref 1–40)
BILIRUB SERPL-MCNC: 0.3 MG/DL (ref 0–1.2)
BUN SERPL-MCNC: 21 MG/DL (ref 6–20)
BUN/CREAT SERPL: 28.8 (ref 7–25)
CALCIUM SPEC-SCNC: 9.6 MG/DL (ref 8.6–10.5)
CHLORIDE SERPL-SCNC: 101 MMOL/L (ref 98–107)
CO2 SERPL-SCNC: 30 MMOL/L (ref 22–29)
CREAT SERPL-MCNC: 0.73 MG/DL (ref 0.76–1.27)
DEPRECATED RDW RBC AUTO: 39.8 FL (ref 37–54)
ERYTHROCYTE [DISTWIDTH] IN BLOOD BY AUTOMATED COUNT: 12.8 % (ref 12.3–15.4)
GFR SERPL CREATININE-BSD FRML MDRD: 118 ML/MIN/1.73
GLOBULIN UR ELPH-MCNC: 3.2 GM/DL
GLUCOSE BLDC GLUCOMTR-MCNC: 161 MG/DL (ref 70–105)
GLUCOSE BLDC GLUCOMTR-MCNC: 171 MG/DL (ref 70–105)
GLUCOSE BLDC GLUCOMTR-MCNC: 181 MG/DL (ref 70–105)
GLUCOSE BLDC GLUCOMTR-MCNC: 222 MG/DL (ref 70–105)
GLUCOSE SERPL-MCNC: 167 MG/DL (ref 65–99)
HCT VFR BLD AUTO: 30.3 % (ref 37.5–51)
HGB BLD-MCNC: 10.6 G/DL (ref 13–17.7)
LYMPHOCYTES # BLD MANUAL: 1.06 10*3/MM3 (ref 0.7–3.1)
LYMPHOCYTES NFR BLD MANUAL: 11 % (ref 5–12)
MCH RBC QN AUTO: 31.2 PG (ref 26.6–33)
MCHC RBC AUTO-ENTMCNC: 35.1 G/DL (ref 31.5–35.7)
MCV RBC AUTO: 88.9 FL (ref 79–97)
MONOCYTES # BLD: 0.73 10*3/MM3 (ref 0.1–0.9)
NEUTROPHILS # BLD AUTO: 4.82 10*3/MM3 (ref 1.7–7)
NEUTROPHILS NFR BLD MANUAL: 65 % (ref 42.7–76)
NEUTS BAND NFR BLD MANUAL: 8 % (ref 0–5)
PLAT MORPH BLD: NORMAL
PLATELET # BLD AUTO: 364 10*3/MM3 (ref 140–450)
PMV BLD AUTO: 6.9 FL (ref 6–12)
POTASSIUM SERPL-SCNC: 3.9 MMOL/L (ref 3.5–5.2)
PROT SERPL-MCNC: 5.9 G/DL (ref 6–8.5)
RBC # BLD AUTO: 3.4 10*6/MM3 (ref 4.14–5.8)
RBC MORPH BLD: NORMAL
SCAN SLIDE: NORMAL
SODIUM SERPL-SCNC: 140 MMOL/L (ref 136–145)
UNSTAINED CELLS: ABNORMAL
VARIANT LYMPHS NFR BLD MANUAL: 14 % (ref 19.6–45.3)
VARIANT LYMPHS NFR BLD MANUAL: 2 % (ref 0–5)
WBC NRBC COR # BLD: 6.6 10*3/MM3 (ref 3.4–10.8)

## 2022-01-15 PROCEDURE — 97112 NEUROMUSCULAR REEDUCATION: CPT

## 2022-01-15 PROCEDURE — 85025 COMPLETE CBC W/AUTO DIFF WBC: CPT | Performed by: SURGERY

## 2022-01-15 PROCEDURE — 72195 MRI PELVIS W/O DYE: CPT

## 2022-01-15 PROCEDURE — 97116 GAIT TRAINING THERAPY: CPT

## 2022-01-15 PROCEDURE — 82962 GLUCOSE BLOOD TEST: CPT

## 2022-01-15 PROCEDURE — 63710000001 INSULIN LISPRO (HUMAN) PER 5 UNITS: Performed by: SURGERY

## 2022-01-15 PROCEDURE — 25010000002 PIPERACILLIN SOD-TAZOBACTAM PER 1 G: Performed by: SURGERY

## 2022-01-15 PROCEDURE — 63710000001 INSULIN GLARGINE PER 5 UNITS: Performed by: INTERNAL MEDICINE

## 2022-01-15 PROCEDURE — 80053 COMPREHEN METABOLIC PANEL: CPT | Performed by: SURGERY

## 2022-01-15 PROCEDURE — 85007 BL SMEAR W/DIFF WBC COUNT: CPT | Performed by: SURGERY

## 2022-01-15 PROCEDURE — 0 MORPHINE SULFATE 4 MG/ML SOLUTION: Performed by: SURGERY

## 2022-01-15 RX ORDER — AMOXICILLIN 250 MG
2 CAPSULE ORAL NIGHTLY
Status: DISCONTINUED | OUTPATIENT
Start: 2022-01-15 | End: 2022-01-19 | Stop reason: HOSPADM

## 2022-01-15 RX ORDER — POLYETHYLENE GLYCOL 3350 17 G/17G
17 POWDER, FOR SOLUTION ORAL DAILY
Status: DISCONTINUED | OUTPATIENT
Start: 2022-01-15 | End: 2022-01-19 | Stop reason: HOSPADM

## 2022-01-15 RX ADMIN — HYDROCODONE BITARTRATE AND ACETAMINOPHEN 1 TABLET: 10; 325 TABLET ORAL at 02:18

## 2022-01-15 RX ADMIN — MORPHINE SULFATE 4 MG: 4 INJECTION INTRAVENOUS at 03:38

## 2022-01-15 RX ADMIN — SODIUM CHLORIDE, PRESERVATIVE FREE 3 ML: 5 INJECTION INTRAVENOUS at 09:05

## 2022-01-15 RX ADMIN — HYDROCODONE BITARTRATE AND ACETAMINOPHEN 1 TABLET: 10; 325 TABLET ORAL at 09:05

## 2022-01-15 RX ADMIN — ACETAMINOPHEN 650 MG: 325 TABLET, FILM COATED ORAL at 16:12

## 2022-01-15 RX ADMIN — MORPHINE SULFATE 4 MG: 4 INJECTION INTRAVENOUS at 16:57

## 2022-01-15 RX ADMIN — HYDROCODONE BITARTRATE AND ACETAMINOPHEN 1 TABLET: 10; 325 TABLET ORAL at 16:59

## 2022-01-15 RX ADMIN — FAMOTIDINE 40 MG: 20 TABLET ORAL at 09:05

## 2022-01-15 RX ADMIN — PIPERACILLIN AND TAZOBACTAM 3.38 G: 3; .375 INJECTION, POWDER, FOR SOLUTION INTRAVENOUS at 02:08

## 2022-01-15 RX ADMIN — DOCUSATE SODIUM 50 MG AND SENNOSIDES 8.6 MG 2 TABLET: 8.6; 5 TABLET, FILM COATED ORAL at 21:12

## 2022-01-15 RX ADMIN — Medication 1 PACKET: at 09:05

## 2022-01-15 RX ADMIN — MORPHINE SULFATE 4 MG: 4 INJECTION INTRAVENOUS at 21:12

## 2022-01-15 RX ADMIN — SODIUM CHLORIDE, PRESERVATIVE FREE 10 ML: 5 INJECTION INTRAVENOUS at 21:13

## 2022-01-15 RX ADMIN — INSULIN GLARGINE 15 UNITS: 100 INJECTION, SOLUTION SUBCUTANEOUS at 21:12

## 2022-01-15 RX ADMIN — POLYETHYLENE GLYCOL 3350 17 G: 17 POWDER, FOR SOLUTION ORAL at 09:05

## 2022-01-15 RX ADMIN — PIPERACILLIN AND TAZOBACTAM 3.38 G: 3; .375 INJECTION, POWDER, FOR SOLUTION INTRAVENOUS at 09:05

## 2022-01-15 RX ADMIN — PIPERACILLIN AND TAZOBACTAM 3.38 G: 3; .375 INJECTION, POWDER, FOR SOLUTION INTRAVENOUS at 19:07

## 2022-01-15 RX ADMIN — INSULIN LISPRO 2 UNITS: 100 INJECTION, SOLUTION INTRAVENOUS; SUBCUTANEOUS at 19:06

## 2022-01-15 RX ADMIN — INSULIN LISPRO 2 UNITS: 100 INJECTION, SOLUTION INTRAVENOUS; SUBCUTANEOUS at 09:05

## 2022-01-15 RX ADMIN — INSULIN LISPRO 3 UNITS: 100 INJECTION, SOLUTION INTRAVENOUS; SUBCUTANEOUS at 13:52

## 2022-01-15 RX ADMIN — SODIUM CHLORIDE, PRESERVATIVE FREE 10 ML: 5 INJECTION INTRAVENOUS at 09:21

## 2022-01-15 RX ADMIN — MORPHINE SULFATE 4 MG: 4 INJECTION INTRAVENOUS at 13:53

## 2022-01-16 LAB
ALBUMIN SERPL-MCNC: 2.8 G/DL (ref 3.5–5.2)
ALBUMIN/GLOB SERPL: 0.9 G/DL
ALP SERPL-CCNC: 70 U/L (ref 39–117)
ALT SERPL W P-5'-P-CCNC: 12 U/L (ref 1–41)
ANION GAP SERPL CALCULATED.3IONS-SCNC: 11 MMOL/L (ref 5–15)
AST SERPL-CCNC: 11 U/L (ref 1–40)
BACTERIA SPEC AEROBE CULT: NORMAL
BACTERIA SPEC AEROBE CULT: NORMAL
BILIRUB SERPL-MCNC: 0.2 MG/DL (ref 0–1.2)
BUN SERPL-MCNC: 20 MG/DL (ref 6–20)
BUN/CREAT SERPL: 28.6 (ref 7–25)
CALCIUM SPEC-SCNC: 9.6 MG/DL (ref 8.6–10.5)
CHLORIDE SERPL-SCNC: 101 MMOL/L (ref 98–107)
CO2 SERPL-SCNC: 29 MMOL/L (ref 22–29)
CREAT SERPL-MCNC: 0.7 MG/DL (ref 0.76–1.27)
DEPRECATED RDW RBC AUTO: 40.7 FL (ref 37–54)
EOSINOPHIL # BLD MANUAL: 0.14 10*3/MM3 (ref 0–0.4)
EOSINOPHIL NFR BLD MANUAL: 2 % (ref 0.3–6.2)
ERYTHROCYTE [DISTWIDTH] IN BLOOD BY AUTOMATED COUNT: 13 % (ref 12.3–15.4)
ERYTHROCYTE [SEDIMENTATION RATE] IN BLOOD: 57 MM/HR (ref 0–15)
GFR SERPL CREATININE-BSD FRML MDRD: 124 ML/MIN/1.73
GLOBULIN UR ELPH-MCNC: 3.2 GM/DL
GLUCOSE BLDC GLUCOMTR-MCNC: 165 MG/DL (ref 70–105)
GLUCOSE BLDC GLUCOMTR-MCNC: 224 MG/DL (ref 70–105)
GLUCOSE BLDC GLUCOMTR-MCNC: 266 MG/DL (ref 70–105)
GLUCOSE BLDC GLUCOMTR-MCNC: 288 MG/DL (ref 70–105)
GLUCOSE SERPL-MCNC: 170 MG/DL (ref 65–99)
HCT VFR BLD AUTO: 31.7 % (ref 37.5–51)
HGB BLD-MCNC: 11 G/DL (ref 13–17.7)
LYMPHOCYTES # BLD MANUAL: 1.21 10*3/MM3 (ref 0.7–3.1)
LYMPHOCYTES NFR BLD MANUAL: 15 % (ref 5–12)
MCH RBC QN AUTO: 31.1 PG (ref 26.6–33)
MCHC RBC AUTO-ENTMCNC: 34.7 G/DL (ref 31.5–35.7)
MCV RBC AUTO: 89.6 FL (ref 79–97)
MONOCYTES # BLD: 1.07 10*3/MM3 (ref 0.1–0.9)
MYELOCYTES NFR BLD MANUAL: 1 % (ref 0–0)
NEUTROPHILS # BLD AUTO: 4.62 10*3/MM3 (ref 1.7–7)
NEUTROPHILS NFR BLD MANUAL: 62 % (ref 42.7–76)
NEUTS BAND NFR BLD MANUAL: 3 % (ref 0–5)
PLAT MORPH BLD: NORMAL
PLATELET # BLD AUTO: 389 10*3/MM3 (ref 140–450)
PMV BLD AUTO: 7 FL (ref 6–12)
POTASSIUM SERPL-SCNC: 4.1 MMOL/L (ref 3.5–5.2)
PROT SERPL-MCNC: 6 G/DL (ref 6–8.5)
RBC # BLD AUTO: 3.54 10*6/MM3 (ref 4.14–5.8)
RBC MORPH BLD: NORMAL
SCAN SLIDE: NORMAL
SODIUM SERPL-SCNC: 141 MMOL/L (ref 136–145)
VARIANT LYMPHS NFR BLD MANUAL: 17 % (ref 19.6–45.3)
WBC MORPH BLD: NORMAL
WBC NRBC COR # BLD: 7.1 10*3/MM3 (ref 3.4–10.8)

## 2022-01-16 PROCEDURE — 80053 COMPREHEN METABOLIC PANEL: CPT | Performed by: SURGERY

## 2022-01-16 PROCEDURE — 63710000001 INSULIN LISPRO (HUMAN) PER 5 UNITS: Performed by: SURGERY

## 2022-01-16 PROCEDURE — 25010000002 MORPHINE PER 10 MG: Performed by: SURGERY

## 2022-01-16 PROCEDURE — 25010000002 PIPERACILLIN SOD-TAZOBACTAM PER 1 G: Performed by: INTERNAL MEDICINE

## 2022-01-16 PROCEDURE — 25010000002 PIPERACILLIN SOD-TAZOBACTAM PER 1 G: Performed by: SURGERY

## 2022-01-16 PROCEDURE — 85025 COMPLETE CBC W/AUTO DIFF WBC: CPT | Performed by: INTERNAL MEDICINE

## 2022-01-16 PROCEDURE — 85007 BL SMEAR W/DIFF WBC COUNT: CPT | Performed by: INTERNAL MEDICINE

## 2022-01-16 PROCEDURE — 85652 RBC SED RATE AUTOMATED: CPT | Performed by: INTERNAL MEDICINE

## 2022-01-16 PROCEDURE — 82962 GLUCOSE BLOOD TEST: CPT

## 2022-01-16 PROCEDURE — 63710000001 INSULIN GLARGINE PER 5 UNITS: Performed by: INTERNAL MEDICINE

## 2022-01-16 PROCEDURE — 0 MORPHINE SULFATE 4 MG/ML SOLUTION: Performed by: SURGERY

## 2022-01-16 RX ADMIN — MORPHINE SULFATE 4 MG: 4 INJECTION INTRAVENOUS at 21:45

## 2022-01-16 RX ADMIN — SODIUM CHLORIDE, PRESERVATIVE FREE 10 ML: 5 INJECTION INTRAVENOUS at 08:28

## 2022-01-16 RX ADMIN — PIPERACILLIN AND TAZOBACTAM 3.38 G: 3; .375 INJECTION, POWDER, FOR SOLUTION INTRAVENOUS at 02:32

## 2022-01-16 RX ADMIN — PIPERACILLIN AND TAZOBACTAM 3.38 G: 3; .375 INJECTION, POWDER, FOR SOLUTION INTRAVENOUS at 18:03

## 2022-01-16 RX ADMIN — INSULIN LISPRO 4 UNITS: 100 INJECTION, SOLUTION INTRAVENOUS; SUBCUTANEOUS at 12:25

## 2022-01-16 RX ADMIN — DOCUSATE SODIUM 50 MG AND SENNOSIDES 8.6 MG 2 TABLET: 8.6; 5 TABLET, FILM COATED ORAL at 21:45

## 2022-01-16 RX ADMIN — HYDROCODONE BITARTRATE AND ACETAMINOPHEN 1 TABLET: 10; 325 TABLET ORAL at 12:26

## 2022-01-16 RX ADMIN — INSULIN LISPRO 2 UNITS: 100 INJECTION, SOLUTION INTRAVENOUS; SUBCUTANEOUS at 08:21

## 2022-01-16 RX ADMIN — MORPHINE SULFATE 4 MG: 4 INJECTION INTRAVENOUS at 02:44

## 2022-01-16 RX ADMIN — Medication 1 PACKET: at 08:21

## 2022-01-16 RX ADMIN — MORPHINE SULFATE 4 MG: 4 INJECTION INTRAVENOUS at 16:12

## 2022-01-16 RX ADMIN — SODIUM CHLORIDE, PRESERVATIVE FREE 10 ML: 5 INJECTION INTRAVENOUS at 21:45

## 2022-01-16 RX ADMIN — INSULIN LISPRO 3 UNITS: 100 INJECTION, SOLUTION INTRAVENOUS; SUBCUTANEOUS at 18:03

## 2022-01-16 RX ADMIN — HYDROCODONE BITARTRATE AND ACETAMINOPHEN 1 TABLET: 10; 325 TABLET ORAL at 08:21

## 2022-01-16 RX ADMIN — INSULIN GLARGINE 15 UNITS: 100 INJECTION, SOLUTION SUBCUTANEOUS at 21:46

## 2022-01-16 RX ADMIN — POLYETHYLENE GLYCOL 3350 17 G: 17 POWDER, FOR SOLUTION ORAL at 08:21

## 2022-01-16 RX ADMIN — FAMOTIDINE 40 MG: 20 TABLET ORAL at 08:21

## 2022-01-16 RX ADMIN — PIPERACILLIN AND TAZOBACTAM 3.38 G: 3; .375 INJECTION, POWDER, FOR SOLUTION INTRAVENOUS at 12:25

## 2022-01-16 RX ADMIN — SODIUM CHLORIDE, PRESERVATIVE FREE 3 ML: 5 INJECTION INTRAVENOUS at 21:46

## 2022-01-16 RX ADMIN — SODIUM CHLORIDE, PRESERVATIVE FREE 3 ML: 5 INJECTION INTRAVENOUS at 08:27

## 2022-01-16 RX ADMIN — COLLAGENASE SANTYL 1 APPLICATION: 250 OINTMENT TOPICAL at 08:22

## 2022-01-16 RX ADMIN — HYDROCODONE BITARTRATE AND ACETAMINOPHEN 1 TABLET: 10; 325 TABLET ORAL at 18:04

## 2022-01-16 RX ADMIN — MORPHINE SULFATE 4 MG: 4 INJECTION INTRAVENOUS at 12:26

## 2022-01-17 LAB
ALBUMIN SERPL-MCNC: 3.1 G/DL (ref 3.5–5.2)
ALBUMIN/GLOB SERPL: 1 G/DL
ALP SERPL-CCNC: 71 U/L (ref 39–117)
ALT SERPL W P-5'-P-CCNC: 12 U/L (ref 1–41)
ANION GAP SERPL CALCULATED.3IONS-SCNC: 12 MMOL/L (ref 5–15)
AST SERPL-CCNC: 10 U/L (ref 1–40)
BACTERIA ISLT: NORMAL
BILIRUB SERPL-MCNC: 0.3 MG/DL (ref 0–1.2)
BUN SERPL-MCNC: 14 MG/DL (ref 6–20)
BUN/CREAT SERPL: 17.3 (ref 7–25)
CALCIUM SPEC-SCNC: 9.3 MG/DL (ref 8.6–10.5)
CHLORIDE SERPL-SCNC: 101 MMOL/L (ref 98–107)
CO2 SERPL-SCNC: 27 MMOL/L (ref 22–29)
CREAT SERPL-MCNC: 0.81 MG/DL (ref 0.76–1.27)
GFR SERPL CREATININE-BSD FRML MDRD: 105 ML/MIN/1.73
GLOBULIN UR ELPH-MCNC: 3.1 GM/DL
GLUCOSE BLDC GLUCOMTR-MCNC: 173 MG/DL (ref 70–105)
GLUCOSE BLDC GLUCOMTR-MCNC: 189 MG/DL (ref 70–105)
GLUCOSE BLDC GLUCOMTR-MCNC: 190 MG/DL (ref 70–105)
GLUCOSE BLDC GLUCOMTR-MCNC: 259 MG/DL (ref 70–105)
GLUCOSE SERPL-MCNC: 153 MG/DL (ref 65–99)
POTASSIUM SERPL-SCNC: 3.9 MMOL/L (ref 3.5–5.2)
PROT SERPL-MCNC: 6.2 G/DL (ref 6–8.5)
SODIUM SERPL-SCNC: 140 MMOL/L (ref 136–145)

## 2022-01-17 PROCEDURE — 25010000002 PIPERACILLIN SOD-TAZOBACTAM PER 1 G: Performed by: INTERNAL MEDICINE

## 2022-01-17 PROCEDURE — 80053 COMPREHEN METABOLIC PANEL: CPT | Performed by: SURGERY

## 2022-01-17 PROCEDURE — 63710000001 INSULIN GLARGINE PER 5 UNITS: Performed by: INTERNAL MEDICINE

## 2022-01-17 PROCEDURE — 82962 GLUCOSE BLOOD TEST: CPT

## 2022-01-17 PROCEDURE — 63710000001 INSULIN LISPRO (HUMAN) PER 5 UNITS: Performed by: SURGERY

## 2022-01-17 PROCEDURE — 0 MORPHINE SULFATE 4 MG/ML SOLUTION: Performed by: SURGERY

## 2022-01-17 RX ORDER — SODIUM HYPOCHLORITE 1.25 MG/ML
SOLUTION TOPICAL 2 TIMES DAILY
Status: DISCONTINUED | OUTPATIENT
Start: 2022-01-17 | End: 2022-01-19 | Stop reason: HOSPADM

## 2022-01-17 RX ADMIN — PIPERACILLIN AND TAZOBACTAM 3.38 G: 3; .375 INJECTION, POWDER, FOR SOLUTION INTRAVENOUS at 18:19

## 2022-01-17 RX ADMIN — SODIUM CHLORIDE, PRESERVATIVE FREE 10 ML: 5 INJECTION INTRAVENOUS at 21:44

## 2022-01-17 RX ADMIN — SODIUM CHLORIDE, PRESERVATIVE FREE 3 ML: 5 INJECTION INTRAVENOUS at 12:09

## 2022-01-17 RX ADMIN — MORPHINE SULFATE 4 MG: 4 INJECTION INTRAVENOUS at 23:36

## 2022-01-17 RX ADMIN — INSULIN LISPRO 4 UNITS: 100 INJECTION, SOLUTION INTRAVENOUS; SUBCUTANEOUS at 12:21

## 2022-01-17 RX ADMIN — FAMOTIDINE 40 MG: 20 TABLET ORAL at 08:23

## 2022-01-17 RX ADMIN — SODIUM CHLORIDE, PRESERVATIVE FREE 10 ML: 5 INJECTION INTRAVENOUS at 12:09

## 2022-01-17 RX ADMIN — MORPHINE SULFATE 4 MG: 4 INJECTION INTRAVENOUS at 06:08

## 2022-01-17 RX ADMIN — MORPHINE SULFATE 4 MG: 4 INJECTION INTRAVENOUS at 02:19

## 2022-01-17 RX ADMIN — PIPERACILLIN AND TAZOBACTAM 3.38 G: 3; .375 INJECTION, POWDER, FOR SOLUTION INTRAVENOUS at 02:11

## 2022-01-17 RX ADMIN — MORPHINE SULFATE 4 MG: 4 INJECTION INTRAVENOUS at 17:07

## 2022-01-17 RX ADMIN — DOCUSATE SODIUM 50 MG AND SENNOSIDES 8.6 MG 2 TABLET: 8.6; 5 TABLET, FILM COATED ORAL at 21:44

## 2022-01-17 RX ADMIN — PIPERACILLIN AND TAZOBACTAM 3.38 G: 3; .375 INJECTION, POWDER, FOR SOLUTION INTRAVENOUS at 12:08

## 2022-01-17 RX ADMIN — INSULIN GLARGINE 15 UNITS: 100 INJECTION, SOLUTION SUBCUTANEOUS at 21:44

## 2022-01-17 RX ADMIN — INSULIN LISPRO 2 UNITS: 100 INJECTION, SOLUTION INTRAVENOUS; SUBCUTANEOUS at 17:03

## 2022-01-17 RX ADMIN — SODIUM CHLORIDE, PRESERVATIVE FREE 3 ML: 5 INJECTION INTRAVENOUS at 21:44

## 2022-01-17 RX ADMIN — HYDROCODONE BITARTRATE AND ACETAMINOPHEN 1 TABLET: 10; 325 TABLET ORAL at 12:21

## 2022-01-17 RX ADMIN — HYDROCODONE BITARTRATE AND ACETAMINOPHEN 1 TABLET: 10; 325 TABLET ORAL at 08:24

## 2022-01-17 RX ADMIN — DAKIN'S SOLUTION 0.125% (QUARTER STRENGTH): 0.12 SOLUTION at 23:35

## 2022-01-17 RX ADMIN — COLLAGENASE SANTYL 1 APPLICATION: 250 OINTMENT TOPICAL at 23:35

## 2022-01-18 LAB
ALBUMIN SERPL-MCNC: 3.3 G/DL (ref 3.5–5.2)
ALBUMIN/GLOB SERPL: 1 G/DL
ALP SERPL-CCNC: 76 U/L (ref 39–117)
ALT SERPL W P-5'-P-CCNC: 13 U/L (ref 1–41)
ANION GAP SERPL CALCULATED.3IONS-SCNC: 9 MMOL/L (ref 5–15)
AST SERPL-CCNC: 10 U/L (ref 1–40)
BILIRUB SERPL-MCNC: 0.3 MG/DL (ref 0–1.2)
BUN SERPL-MCNC: 12 MG/DL (ref 6–20)
BUN/CREAT SERPL: 15.8 (ref 7–25)
CALCIUM SPEC-SCNC: 9.6 MG/DL (ref 8.6–10.5)
CHLORIDE SERPL-SCNC: 102 MMOL/L (ref 98–107)
CO2 SERPL-SCNC: 29 MMOL/L (ref 22–29)
CREAT SERPL-MCNC: 0.76 MG/DL (ref 0.76–1.27)
GFR SERPL CREATININE-BSD FRML MDRD: 113 ML/MIN/1.73
GLOBULIN UR ELPH-MCNC: 3.4 GM/DL
GLUCOSE BLDC GLUCOMTR-MCNC: 167 MG/DL (ref 70–105)
GLUCOSE BLDC GLUCOMTR-MCNC: 205 MG/DL (ref 70–105)
GLUCOSE BLDC GLUCOMTR-MCNC: 210 MG/DL (ref 70–105)
GLUCOSE BLDC GLUCOMTR-MCNC: 241 MG/DL (ref 70–105)
GLUCOSE SERPL-MCNC: 167 MG/DL (ref 65–99)
POTASSIUM SERPL-SCNC: 4.1 MMOL/L (ref 3.5–5.2)
PROT SERPL-MCNC: 6.7 G/DL (ref 6–8.5)
SODIUM SERPL-SCNC: 140 MMOL/L (ref 136–145)

## 2022-01-18 PROCEDURE — 63710000001 INSULIN LISPRO (HUMAN) PER 5 UNITS: Performed by: SURGERY

## 2022-01-18 PROCEDURE — 97112 NEUROMUSCULAR REEDUCATION: CPT

## 2022-01-18 PROCEDURE — 80053 COMPREHEN METABOLIC PANEL: CPT | Performed by: SURGERY

## 2022-01-18 PROCEDURE — 97116 GAIT TRAINING THERAPY: CPT

## 2022-01-18 PROCEDURE — 0 MORPHINE SULFATE 4 MG/ML SOLUTION: Performed by: SURGERY

## 2022-01-18 PROCEDURE — 63710000001 INSULIN GLARGINE PER 5 UNITS: Performed by: INTERNAL MEDICINE

## 2022-01-18 PROCEDURE — 82962 GLUCOSE BLOOD TEST: CPT

## 2022-01-18 PROCEDURE — 25010000002 PIPERACILLIN SOD-TAZOBACTAM PER 1 G: Performed by: INTERNAL MEDICINE

## 2022-01-18 PROCEDURE — 97110 THERAPEUTIC EXERCISES: CPT

## 2022-01-18 RX ADMIN — SODIUM CHLORIDE, PRESERVATIVE FREE 10 ML: 5 INJECTION INTRAVENOUS at 21:26

## 2022-01-18 RX ADMIN — DAKIN'S SOLUTION 0.125% (QUARTER STRENGTH): 0.12 SOLUTION at 11:08

## 2022-01-18 RX ADMIN — HYDROCODONE BITARTRATE AND ACETAMINOPHEN 1 TABLET: 10; 325 TABLET ORAL at 16:08

## 2022-01-18 RX ADMIN — INSULIN LISPRO 3 UNITS: 100 INJECTION, SOLUTION INTRAVENOUS; SUBCUTANEOUS at 12:10

## 2022-01-18 RX ADMIN — HYDROCODONE BITARTRATE AND ACETAMINOPHEN 1 TABLET: 10; 325 TABLET ORAL at 12:09

## 2022-01-18 RX ADMIN — PIPERACILLIN AND TAZOBACTAM 3.38 G: 3; .375 INJECTION, POWDER, FOR SOLUTION INTRAVENOUS at 11:07

## 2022-01-18 RX ADMIN — DAKIN'S SOLUTION 0.125% (QUARTER STRENGTH): 0.12 SOLUTION at 21:56

## 2022-01-18 RX ADMIN — SODIUM CHLORIDE, PRESERVATIVE FREE 10 ML: 5 INJECTION INTRAVENOUS at 11:09

## 2022-01-18 RX ADMIN — COLLAGENASE SANTYL 1 APPLICATION: 250 OINTMENT TOPICAL at 11:08

## 2022-01-18 RX ADMIN — INSULIN GLARGINE 15 UNITS: 100 INJECTION, SOLUTION SUBCUTANEOUS at 21:26

## 2022-01-18 RX ADMIN — PIPERACILLIN AND TAZOBACTAM 3.38 G: 3; .375 INJECTION, POWDER, FOR SOLUTION INTRAVENOUS at 02:21

## 2022-01-18 RX ADMIN — INSULIN LISPRO 3 UNITS: 100 INJECTION, SOLUTION INTRAVENOUS; SUBCUTANEOUS at 17:07

## 2022-01-18 RX ADMIN — MORPHINE SULFATE 4 MG: 4 INJECTION INTRAVENOUS at 04:14

## 2022-01-18 RX ADMIN — SODIUM CHLORIDE, PRESERVATIVE FREE 3 ML: 5 INJECTION INTRAVENOUS at 11:09

## 2022-01-18 RX ADMIN — PIPERACILLIN AND TAZOBACTAM 3.38 G: 3; .375 INJECTION, POWDER, FOR SOLUTION INTRAVENOUS at 17:07

## 2022-01-18 RX ADMIN — FAMOTIDINE 40 MG: 20 TABLET ORAL at 08:16

## 2022-01-18 RX ADMIN — INSULIN LISPRO 2 UNITS: 100 INJECTION, SOLUTION INTRAVENOUS; SUBCUTANEOUS at 08:16

## 2022-01-18 RX ADMIN — SODIUM CHLORIDE, PRESERVATIVE FREE 3 ML: 5 INJECTION INTRAVENOUS at 21:25

## 2022-01-18 RX ADMIN — HYDROCODONE BITARTRATE AND ACETAMINOPHEN 1 TABLET: 10; 325 TABLET ORAL at 21:25

## 2022-01-18 RX ADMIN — DOCUSATE SODIUM 50 MG AND SENNOSIDES 8.6 MG 2 TABLET: 8.6; 5 TABLET, FILM COATED ORAL at 21:25

## 2022-01-18 RX ADMIN — HYDROCODONE BITARTRATE AND ACETAMINOPHEN 1 TABLET: 10; 325 TABLET ORAL at 08:16

## 2022-01-19 ENCOUNTER — READMISSION MANAGEMENT (OUTPATIENT)
Dept: CALL CENTER | Facility: HOSPITAL | Age: 42
End: 2022-01-19

## 2022-01-19 VITALS
SYSTOLIC BLOOD PRESSURE: 120 MMHG | BODY MASS INDEX: 24.93 KG/M2 | DIASTOLIC BLOOD PRESSURE: 64 MMHG | WEIGHT: 188.1 LBS | OXYGEN SATURATION: 98 % | HEART RATE: 97 BPM | RESPIRATION RATE: 16 BRPM | HEIGHT: 73 IN | TEMPERATURE: 98.3 F

## 2022-01-19 PROBLEM — E10.65 TYPE 1 DIABETES MELLITUS WITH HYPERGLYCEMIA: Status: RESOLVED | Noted: 2022-01-19 | Resolved: 2022-01-19

## 2022-01-19 PROBLEM — E11.9 TYPE 2 DIABETES MELLITUS: Status: ACTIVE | Noted: 2022-01-19

## 2022-01-19 PROBLEM — E10.65 TYPE 1 DIABETES MELLITUS WITH HYPERGLYCEMIA: Status: ACTIVE | Noted: 2022-01-19

## 2022-01-19 LAB
ALBUMIN SERPL-MCNC: 3.2 G/DL (ref 3.5–5.2)
ALBUMIN/GLOB SERPL: 1 G/DL
ALP SERPL-CCNC: 77 U/L (ref 39–117)
ALT SERPL W P-5'-P-CCNC: 14 U/L (ref 1–41)
ANION GAP SERPL CALCULATED.3IONS-SCNC: 12 MMOL/L (ref 5–15)
AST SERPL-CCNC: 11 U/L (ref 1–40)
BILIRUB SERPL-MCNC: 0.3 MG/DL (ref 0–1.2)
BUN SERPL-MCNC: 15 MG/DL (ref 6–20)
BUN/CREAT SERPL: 17.9 (ref 7–25)
CALCIUM SPEC-SCNC: 9.4 MG/DL (ref 8.6–10.5)
CHLORIDE SERPL-SCNC: 101 MMOL/L (ref 98–107)
CO2 SERPL-SCNC: 28 MMOL/L (ref 22–29)
CREAT SERPL-MCNC: 0.84 MG/DL (ref 0.76–1.27)
GFR SERPL CREATININE-BSD FRML MDRD: 101 ML/MIN/1.73
GLOBULIN UR ELPH-MCNC: 3.3 GM/DL
GLUCOSE BLDC GLUCOMTR-MCNC: 201 MG/DL (ref 70–105)
GLUCOSE BLDC GLUCOMTR-MCNC: 221 MG/DL (ref 70–105)
GLUCOSE SERPL-MCNC: 181 MG/DL (ref 65–99)
POTASSIUM SERPL-SCNC: 4 MMOL/L (ref 3.5–5.2)
PROT SERPL-MCNC: 6.5 G/DL (ref 6–8.5)
SODIUM SERPL-SCNC: 141 MMOL/L (ref 136–145)

## 2022-01-19 PROCEDURE — 25010000002 PIPERACILLIN SOD-TAZOBACTAM PER 1 G: Performed by: INTERNAL MEDICINE

## 2022-01-19 PROCEDURE — C1751 CATH, INF, PER/CENT/MIDLINE: HCPCS

## 2022-01-19 PROCEDURE — 82962 GLUCOSE BLOOD TEST: CPT

## 2022-01-19 PROCEDURE — 36410 VNPNXR 3YR/> PHY/QHP DX/THER: CPT

## 2022-01-19 PROCEDURE — 63710000001 INSULIN LISPRO (HUMAN) PER 5 UNITS: Performed by: SURGERY

## 2022-01-19 PROCEDURE — 80053 COMPREHEN METABOLIC PANEL: CPT | Performed by: SURGERY

## 2022-01-19 RX ORDER — HYDROCODONE BITARTRATE AND ACETAMINOPHEN 10; 325 MG/1; MG/1
1 TABLET ORAL EVERY 4 HOURS PRN
Qty: 10 TABLET | Refills: 0 | Status: SHIPPED | OUTPATIENT
Start: 2022-01-19 | End: 2022-01-26

## 2022-01-19 RX ORDER — HYDROCODONE BITARTRATE AND ACETAMINOPHEN 10; 325 MG/1; MG/1
1 TABLET ORAL EVERY 4 HOURS PRN
Status: DISCONTINUED | OUTPATIENT
Start: 2022-01-19 | End: 2022-01-19 | Stop reason: HOSPADM

## 2022-01-19 RX ORDER — POLYETHYLENE GLYCOL 3350 17 G/17G
17 POWDER, FOR SOLUTION ORAL 2 TIMES DAILY PRN
Start: 2022-01-19

## 2022-01-19 RX ORDER — COLLAGENASE SANTYL 250 [ARB'U]/G
OINTMENT TOPICAL
Qty: 30 G | Refills: 2 | Status: SHIPPED | OUTPATIENT
Start: 2022-01-19

## 2022-01-19 RX ORDER — FAMOTIDINE 20 MG/1
40 TABLET, FILM COATED ORAL DAILY
Status: CANCELLED | OUTPATIENT
Start: 2022-01-20

## 2022-01-19 RX ORDER — AMOXICILLIN 250 MG
2 CAPSULE ORAL NIGHTLY
Start: 2022-01-19

## 2022-01-19 RX ORDER — INSULIN GLARGINE 100 [IU]/ML
15 INJECTION, SOLUTION SUBCUTANEOUS NIGHTLY
Qty: 15 ML | Refills: 12 | Status: SHIPPED | OUTPATIENT
Start: 2022-01-19

## 2022-01-19 RX ORDER — SODIUM HYPOCHLORITE 1.25 MG/ML
SOLUTION TOPICAL
Qty: 473 ML | Refills: 2 | Status: SHIPPED | OUTPATIENT
Start: 2022-01-19 | End: 2022-05-18

## 2022-01-19 RX ADMIN — DAKIN'S SOLUTION 0.125% (QUARTER STRENGTH): 0.12 SOLUTION at 11:12

## 2022-01-19 RX ADMIN — HYDROCODONE BITARTRATE AND ACETAMINOPHEN 1 TABLET: 10; 325 TABLET ORAL at 14:31

## 2022-01-19 RX ADMIN — SODIUM CHLORIDE, PRESERVATIVE FREE 10 ML: 5 INJECTION INTRAVENOUS at 10:00

## 2022-01-19 RX ADMIN — INSULIN LISPRO 3 UNITS: 100 INJECTION, SOLUTION INTRAVENOUS; SUBCUTANEOUS at 12:55

## 2022-01-19 RX ADMIN — PIPERACILLIN AND TAZOBACTAM 3.38 G: 3; .375 INJECTION, POWDER, FOR SOLUTION INTRAVENOUS at 01:49

## 2022-01-19 RX ADMIN — FAMOTIDINE 40 MG: 20 TABLET ORAL at 10:00

## 2022-01-19 RX ADMIN — HYDROCODONE BITARTRATE AND ACETAMINOPHEN 1 TABLET: 10; 325 TABLET ORAL at 10:00

## 2022-01-19 RX ADMIN — INSULIN LISPRO 3 UNITS: 100 INJECTION, SOLUTION INTRAVENOUS; SUBCUTANEOUS at 10:01

## 2022-01-19 RX ADMIN — HYDROCODONE BITARTRATE AND ACETAMINOPHEN 1 TABLET: 10; 325 TABLET ORAL at 06:03

## 2022-01-19 RX ADMIN — Medication 1 PACKET: at 10:00

## 2022-01-19 RX ADMIN — HYDROCODONE BITARTRATE AND ACETAMINOPHEN 1 TABLET: 10; 325 TABLET ORAL at 01:56

## 2022-01-19 RX ADMIN — SODIUM CHLORIDE, PRESERVATIVE FREE 3 ML: 5 INJECTION INTRAVENOUS at 10:00

## 2022-01-19 RX ADMIN — PIPERACILLIN AND TAZOBACTAM 3.38 G: 3; .375 INJECTION, POWDER, FOR SOLUTION INTRAVENOUS at 10:02

## 2022-01-19 RX ADMIN — COLLAGENASE SANTYL 1 APPLICATION: 250 OINTMENT TOPICAL at 11:15

## 2022-01-20 NOTE — OUTREACH NOTE
Prep Survey      Responses   [facility]? [facility]   Is LACE score < 7 ? No   Emergency Room discharge w/ pulse ox? No   Eligibility Readm Mgmt   Discharge diagnosis Pressure injury of skin with infection   Does the patient have one of the following disease processes/diagnoses(primary or secondary)? General Surgery   Comments regarding appointments see AVS   Medication alerts for this patient see AVS   Prep survey completed? Yes          Ale Olmos RN

## 2022-01-24 ENCOUNTER — OFFICE VISIT (OUTPATIENT)
Dept: WOUND CARE | Facility: HOSPITAL | Age: 42
End: 2022-01-24

## 2022-01-24 ENCOUNTER — READMISSION MANAGEMENT (OUTPATIENT)
Dept: CALL CENTER | Facility: HOSPITAL | Age: 42
End: 2022-01-24

## 2022-01-24 ENCOUNTER — LAB REQUISITION (OUTPATIENT)
Dept: LAB | Facility: HOSPITAL | Age: 42
End: 2022-01-24

## 2022-01-24 DIAGNOSIS — L89.154 PRESSURE ULCER OF SACRAL REGION, STAGE 4: ICD-10-CM

## 2022-01-24 PROCEDURE — 87205 SMEAR GRAM STAIN: CPT | Performed by: NURSE PRACTITIONER

## 2022-01-24 PROCEDURE — 87070 CULTURE OTHR SPECIMN AEROBIC: CPT | Performed by: NURSE PRACTITIONER

## 2022-01-24 PROCEDURE — G0463 HOSPITAL OUTPT CLINIC VISIT: HCPCS

## 2022-01-24 NOTE — OUTREACH NOTE
General Surgery Week 1 Survey      Responses   [facility] patient discharged from? [facility]   Does the patient have one of the following disease processes/diagnoses(primary or secondary)? General Surgery   Week 1 attempt successful? No   Unsuccessful attempts Attempt 1          Xochitl Camarena RN

## 2022-01-26 ENCOUNTER — READMISSION MANAGEMENT (OUTPATIENT)
Dept: CALL CENTER | Facility: HOSPITAL | Age: 42
End: 2022-01-26

## 2022-01-27 LAB
BACTERIA SPEC AEROBE CULT: NORMAL
GRAM STN SPEC: NORMAL

## 2022-01-31 ENCOUNTER — OFFICE VISIT (OUTPATIENT)
Dept: WOUND CARE | Facility: HOSPITAL | Age: 42
End: 2022-01-31

## 2022-01-31 PROCEDURE — G0463 HOSPITAL OUTPT CLINIC VISIT: HCPCS

## 2022-02-02 ENCOUNTER — READMISSION MANAGEMENT (OUTPATIENT)
Dept: CALL CENTER | Facility: HOSPITAL | Age: 42
End: 2022-02-02

## 2022-02-02 NOTE — OUTREACH NOTE
General Surgery Week 2 Survey      Responses   [facility] patient discharged from? [facility]   Does the patient have one of the following disease processes/diagnoses(primary or secondary)? General Surgery   Week 2 attempt successful? Yes   Call start time 1500   Revoke Decline to participate   Call end time 1500          Ayaka Bravo, RN

## 2022-02-07 ENCOUNTER — OFFICE VISIT (OUTPATIENT)
Dept: WOUND CARE | Facility: HOSPITAL | Age: 42
End: 2022-02-07

## 2022-02-07 PROCEDURE — G0463 HOSPITAL OUTPT CLINIC VISIT: HCPCS

## 2022-02-14 ENCOUNTER — OFFICE VISIT (OUTPATIENT)
Dept: WOUND CARE | Facility: HOSPITAL | Age: 42
End: 2022-02-14

## 2022-02-21 ENCOUNTER — OFFICE VISIT (OUTPATIENT)
Dept: WOUND CARE | Facility: HOSPITAL | Age: 42
End: 2022-02-21

## 2022-02-21 PROCEDURE — G0463 HOSPITAL OUTPT CLINIC VISIT: HCPCS

## 2022-02-28 ENCOUNTER — OFFICE VISIT (OUTPATIENT)
Dept: WOUND CARE | Facility: HOSPITAL | Age: 42
End: 2022-02-28

## 2022-02-28 PROCEDURE — G0463 HOSPITAL OUTPT CLINIC VISIT: HCPCS

## 2022-03-07 ENCOUNTER — OFFICE VISIT (OUTPATIENT)
Dept: WOUND CARE | Facility: HOSPITAL | Age: 42
End: 2022-03-07

## 2022-03-07 PROCEDURE — G0463 HOSPITAL OUTPT CLINIC VISIT: HCPCS

## 2022-03-14 ENCOUNTER — OFFICE VISIT (OUTPATIENT)
Dept: WOUND CARE | Facility: HOSPITAL | Age: 42
End: 2022-03-14

## 2022-03-14 PROCEDURE — G0463 HOSPITAL OUTPT CLINIC VISIT: HCPCS

## 2022-03-21 ENCOUNTER — APPOINTMENT (OUTPATIENT)
Dept: WOUND CARE | Facility: HOSPITAL | Age: 42
End: 2022-03-21

## 2022-03-28 ENCOUNTER — OFFICE VISIT (OUTPATIENT)
Dept: WOUND CARE | Facility: HOSPITAL | Age: 42
End: 2022-03-28

## 2022-03-28 PROCEDURE — G0463 HOSPITAL OUTPT CLINIC VISIT: HCPCS

## 2022-04-11 ENCOUNTER — OFFICE VISIT (OUTPATIENT)
Dept: WOUND CARE | Facility: HOSPITAL | Age: 42
End: 2022-04-11

## 2022-04-11 PROCEDURE — G0463 HOSPITAL OUTPT CLINIC VISIT: HCPCS

## 2022-04-18 ENCOUNTER — OFFICE VISIT (OUTPATIENT)
Dept: WOUND CARE | Facility: HOSPITAL | Age: 42
End: 2022-04-18

## 2022-04-18 ENCOUNTER — APPOINTMENT (OUTPATIENT)
Dept: WOUND CARE | Facility: HOSPITAL | Age: 42
End: 2022-04-18

## 2022-04-18 PROCEDURE — G0463 HOSPITAL OUTPT CLINIC VISIT: HCPCS

## 2022-04-18 NOTE — OUTREACH NOTE
General Surgery Week 1 Survey      Responses   [facility] patient discharged from? [facility]   Does the patient have one of the following disease processes/diagnoses(primary or secondary)? General Surgery   Week 1 attempt successful? Yes   Call start time 1252   Call end time 1301   Discharge diagnosis Pressure injury of skin with infection   Meds reviewed with patient/caregiver? Yes   Is the patient having any side effects they believe may be caused by any medication additions or changes? No   Does the patient have all medications related to this admission filled (includes all antibiotics, pain medications, etc.) Yes   Is the patient taking all medications as directed (includes completed medication regime)? Yes   Does the patient have a follow up appointment scheduled with their surgeon? Yes   Has the patient kept scheduled appointments due by today? Yes   Has home health visited the patient within 72 hours of discharge? N/A   Psychosocial issues? No   Did the patient receive a copy of their discharge instructions? Yes   Nursing interventions Reviewed instructions with patient   What is the patient's perception of their health status since discharge? Improving   Nursing interventions Nurse provided patient education   Is the patient /caregiver able to teach back basic post-op care? Continue use of incentive spirometry at least 1 week post discharge,  Practice 'cough and deep breath',  Drive as instructed by MD in discharge instructions,  Keep incision areas clean,dry and protected,  Do not remove steri-strips,  Lifting as instructed by MD in discharge instructions   Is the patient/caregiver able to teach back signs and symptoms of incisional infection? Increased redness, swelling or pain at the incisonal site,  Increased drainage or bleeding,  Incisional warmth,  Fever,  Pus or odor from incision   Is the patient/caregiver able to teach back steps to recovery at home? Set small, achievable goals for return to  [facility] [place]  [facility]  Pulmonary Hypertension Clinic  Cardiolology Clinic Visit    Date of Visit: 4/20/2022  Patient Name:  Sulma Ann  Medical Record Number:  [id number]  YOB: 1938   Primary Physician:  ISAAC Rosenberg  Other Physicians: Dr. Jones (Pulmonary) and Dr. Crawford (Uro-gynecologist)     CHIEF COMPLAINT: follow-up diastolic dysfunction and hypertensive heart disease    HISTORY OF PRESENT ILLNESS:  Sulma Ann is a 84 year old female who presents to the clinic today for diastolic dysfunction and hypertensive heart disease.     Since last visit:  She is unaccompanied.     Ms. Ann reports no cardiac concerns today.  She notes no change in her symptoms since last visit. She has no difficulty with her ADL's.  She denies shortness of breath, chest pain/pressure, orthopnea, PND, lower extremity edema, abdominal bloating, early satiety, dizziness, lightheadedness or palpitations.  Her weight at home fluctuates 5 lbs.  Her blood pressure at home has typically been around 120's/60-70's.  She denies fevers, chills, sweats, nausea, vomiting, diarrhea or cough.  She denies bleeding problems, hematochezia or melena. She wears her CPAP every night. She tries to limit her sodium and fluid intake.    She takes her medications every day, never misses a dose, and has had no problems with them.        Frequency / Description   []  YES    [x]  NO Dyspnea:   []  YES    [x]  NO Angina:   []  YES    [x]  NO Claudication:   []  YES    [x]  NO Presyncope/Syncope:   []  YES    [x]  NO Orthopnea:   []  YES    [x]  NO PND (Paroxysmal nocturnal dyspnea):   []  YES    [x]  NO Pedal Edema:   []  YES    [x]  NO Abdominal Swelling:   []  YES    [x]  NO Early Satiety:    []  YES    [x]  NO Weight Gain:  []  YES    [x]  NO Palpitations:  []  YES    [x]  NO OTHER:  []  YES    [x]  NO Hospitalization(s):   [x]  YES    []  NO  Emergency Room Visit:  For vertigo in [place] Aug 3rd    PULMONARY ARTERIAL HYPERTENSION MEDICATIONS   [x] NONE [] PDE5-I [] ERA [] soluble guanylate cyclase (sGC) stimulator   [] selective prostacyclin receptor stimulator [] Prostacyclin    MEDICATION SIDE EFFECTS   []  YES    [x]  NO    OTHER MEDICATIONS   [] Diuretic   [x] BB (beta blocker)  [] ACEi (angiotensin converting enzyme inhibitor)  [x] ARB (angiotensin receptor blocker)  [] MRA (mineralocorticoid receptor antagonist)  [] Nitrate  [] Hydralazine [] DIG (digoxin)  [x] CCB (calcium channel blocker)    DEVICES   [x] NONE [] ICD (implantable cardioverter-defibrillator)  [] BIV - ICD (biventricular implantable cardioverter-defibrillator)  [] pacemaker  [] CardioMEMs      COMPLIANCE   Description   [x]  YES    []  NO Medication(s):   [x]  YES    []  NO Diet:    Allergies, Past Medical, Family and Social History reviewed in EPIC.     Outpatient Medications Marked as Taking for the 4/20/22 encounter (Follow up) with Silverio Leyva MD PHD   Medication Sig Dispense Refill   • levothyroxine 25 MCG tablet Take 1 tablet by mouth daily.     • estriol 0.05 mg/g (0.005 %) cream (in natural base) Place vaginally 3 days a week. Apply 1 gram (1 mL) into the vagina nightly.     • oxyCODONE-acetaminophen (PERCOCET) 5-325 MG per tablet Take 1 tablet by mouth every 4 hours as needed for Pain.     • suvorexant (Belsomra) 20 MG tablet Take 20 mg by mouth at bedtime.     • montelukast (SINGULAIR) 10 MG tablet TAKE 1 TABLET BY MOUTH IN  THE EVENING 90 tablet 3   • Multiple Vitamins-Minerals (ZINC PO) Take 1 tablet by mouth daily.     • VITAMIN D-VITAMIN K PO Take 1,000 mg by mouth.     • fluticasone-vilanterol (BREO ELLIPTA) 200-25 MCG/INH inhaler Inhale 1 puff into the lungs daily.     • BYSTOLIC 5 MG tablet TAKE 1 TABLET BY MOUTH  DAILY 30 tablet 11   • rosuvastatin (CRESTOR) 10 MG tablet Take 10 mg by mouth daily.      • baclofen (LIORESAL) 10 MG tablet Take 10 mg by mouth 2 times  baseline health,  Rest and rebuild strength, gradually increase activity,  Eat a well-balance diet,  Make a list of questions for surgeon's appointment   If the patient is a current smoker, are they able to teach back resources for cessation? [id number]  [Chews tobacco]   Is the patient/caregiver able to teach back the hierarchy of who to call/visit for symptoms/problems? PCP, Specialist, Home health nurse, Urgent Care, ED, 911 Yes   Additional teach back comments Poor appetite with covid brought on DKA and this caused the ventilation. Sleepiness and confusion were his heralding signs.   Week 1 call completed? Yes   Wrap up additional comments WC debrided and will again most likely she says. They say it looks good and she is using less packing.          Kya Lara RN   daily as needed (muscle spasms). Indications: prn      • gabapentin (NEURONTIN) 300 MG capsule Take 300 mg by mouth 3 times daily.      • Magnesium 400 MG Tab Take 400 mg by mouth daily.      • PARoxetine (PAXIL) 20 MG tablet Take 20 mg by mouth daily.      • aspirin 81 MG tablet Take 81 mg by mouth every other day.      • albuterol 108 (90 Base) MCG/ACT inhaler Inhale 2 puffs into the lungs every 4 hours as needed for Shortness of Breath or Wheezing. 1 Inhaler 6   • amLODIPine (NORVASC) 5 MG tablet Take 2.5 mg by mouth daily.     • CRANBERRY PO Take 1 capsule by mouth daily.      • Coenzyme Q10 (CO Q 10 PO) Take 1 capsule by mouth daily.      • Fish Oil Oil Take 2 tablets by mouth daily.      • Probiotic Product (FLORAJEN3 PO) Take 1 capsule by mouth daily.      • losartan (COZAAR) 100 MG tablet Take 100 mg by mouth daily.     • Homeopathic Products ([employer] LEG CRAMPS PAIN RELIEF) TABS Take 1 tablet by mouth daily as needed.      • CALCIUM CARBONATE PO Take 2 tablets by mouth daily.       REVIEW OF SYSTEMS:  6 point review of systems was completed and is negative except as stated above in HPI (history of present illness).    Visit Vitals  /63   Pulse 69   Ht 5' 3\" (1.6 m)   Wt 63.8 kg (140 lb 9.6 oz)   SpO2 96%   BMI 24.91 kg/m²     Body mass index is 24.91 kg/m².    PHYSICAL EXAMINATION:       Constitutional:  pleasant, well nourished 84 year old female in no acute distress  Skin:  warm, dry, intact, no lesions  HEENT:  normocephalic, atraumatic; oral mucous membranes moist; extraocular movements intact  Neck:  supple; trachea midline; JVP = 10 cm H2O; negative hepatojugular reflux; negative carotid bruits bilaterally  Cardiovascular:  regular rate and rhythm; normal S1, S2; no murmur; negative S3, S4  Respiratory:  anterior/posterior lung sounds clear to auscultation bilaterally  Abdomen:  normal bowel sounds; abdomen soft, nontender, nondistended; no hepatomegaly  Musculoskeletal/Extremities:  Capillary  refill time <3; no clubbing, no cyanosis; no peripheral edema  Neurological:  no focal neurological deficits; speech normal; sensation grossly intact  Psychiatric:  alert and oriented to person, place and time      LABORATORY:  Recent Labs   Lab 03/07/22  0515 03/06/22  1524   Sodium 141 140   Potassium 3.9 4.0   Chloride 108* 104   Carbon Dioxide 24 26   BUN 20 24*   Calcium 8.1* 8.8   Glucose 82 85   Creatinine 0.70 0.78     No results for input(s): TSH, CHOLESTEROL, HDL, TRIG, LDLDIR, CHOHDL, PSA in the last 8765 hours.  Recent Labs   Lab 03/07/22  0515 03/06/22  1524   WBC 6.6 7.4   RBC 4.17 4.51   HGB 12.9 13.4   HCT 38.5 41.0    261   Prothrombin Time  --  10.9   INR  --  1.0     Lab Results   Component Value Date    BNP 14 02/13/2013      Lab Results   Component Value Date    NTPROB 215 03/06/2022     Lab Results   Component Value Date    RESR 10 02/13/2013    ANABL NEGATIVE 02/13/2013    RA <10 02/13/2013    CRP <0.3 02/13/2013     DIAGNOSTICS:  The following diagnostics were reviewed:    Echo 3/15/2022:  Mild pulmonary hypertension; RVSP 40 mmHg.  Normal right ventricular chamber size.  Normal right ventricular systolic function. FAC 43%. TAPSE 17 mm.  RV longitudinal strain -28 %.  Normal IVC dimension with <50% respiratory change of the inferior vena cava.  Normal left ventricular chamber size, wall thickness and systolic function with no regional wall motion abnormalities. LV EF 67 %.  LV Global longitudinal strain -20.0 %.  Grade II diastolic dysfunction of the left ventricle (pseudonormal filling pattern).  Moderately increased left atrial chamber size.  Left atrial volume index of 43.9 ml/m².  No pericardial effusion.  Compared to the study from 08/24/21 the previously reported PASP of 47 mmHg (RAP 15 mmHg) has decreased to 40 mmHg (RAP 8  mmHg).    Echo 8/24/2021:  Normal LV chamber size and systolic function, EF = 69 %. GLS= - 20%.  Grade II diastolic dysfunction. Moderately increased Gregorio =  44 ml/m².  Normal RV size and systolic function, PASP = 47 mmHg. RV strain = -33%.  No significant valve abnormalities.  No pericardial effusion.  Compared to the previous study (8/6/2020), the PASP has increased from 36 mmHg (RAP 3) to 47 mmHg (RAP 15).    7/17/2019 ECHO:  F/U Pulmonary Hypertension.  Moderate pulmonary hypertension, PASP 42 mmHg.  Normal RV size and radial function, FAC 40 %.  Normal RV longitudinal function, TAPSE 26 mm.  Normal LV size and systolic function, LVEF 67%.  Grade II/IV diastolic dysfunction, moderately elevated filling pressures.  No pericardial effusion.  Compared to the previous study 7/13/2018, the diastolic function has worsened from grade I to grade II.      ASSESSMENT:  1. Diastolic Dysfunction  2. Pulmonary hypertension due to diastolic dysfunction    3. 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 RANCHO on CPAP nightly  7. Asthma    PLAN:  1. No medication changes. Continue present medications  2. Weights daily.  Call with 3 lb weight gain in one day or 5 lb weight gain in one week.   3. Limit sodium to 2000mg daily and fluids to 64 ounces (2000 ml) daily.   4. Avoid the use of NSAIDs including but not limited to Ibuprofen, Advil and Aleve. Encouraged to check with community pharmacist with all over the counter medications to ensure product does not contain NSAIDs.   5. PCP monitors routine lab work including cholesterol.   6. Follow-up 12 months in clinic with full echocardiogram.       Patient understands she can return sooner if any issues should arise.    On 4/20/2022, Padmaja SOUSA scribed the services personally performed by Silverio Leyva MD PHD      CARDIOLOGY ATTENDING ATTESTATION:  I personally completed the history, physical examination, review of studies, and assessment/plan.  I have reviewed and edited and agree with the findings and plan as  scribed above.  I spent 30 minutes with the patient, with 20 minutes dedicated to patient counseling and coordination of care.     Silverio Leyva MD, PhD  Cardiology - Heart Failure & Transplant  [facility]

## 2022-04-25 ENCOUNTER — APPOINTMENT (OUTPATIENT)
Dept: WOUND CARE | Facility: HOSPITAL | Age: 42
End: 2022-04-25

## 2022-05-02 ENCOUNTER — OFFICE VISIT (OUTPATIENT)
Dept: WOUND CARE | Facility: HOSPITAL | Age: 42
End: 2022-05-02

## 2022-05-02 PROCEDURE — G0463 HOSPITAL OUTPT CLINIC VISIT: HCPCS

## 2022-05-13 NOTE — PROGRESS NOTES
EMG and Nerve Conduction Studies    The complete report includes the data sheets.     Date of Study: 5/18/22    Referring Provider:    History: This patient is a 41-year-old male with history of insulin-dependent diabetes.  He unfortunately was hospitalized and intubated for approximately a week due to the COVID virus last December.  He reports all of his IVs were in the right arm while hospitalized.  He reports ever since this hospitalization he has had weakness in the right arm.  He has also had atrophy of muscles in the shoulder and arm.  He complains of numbness as well.    Results:    1.  The right median sensory and motor distal latencies are prolonged.  The right median forearm conduction velocity is at the lower limit of normal.    2.  The ulnar sensory and motor distal latencies are normal and the amplitude of the compound muscle action potential is normal the conduction velocity both below and across elbow are slow but there is no evidence of focal slowing at the elbow.    3.  There are acute denervation as well as chronic denervation changes particularly noted in the deltoid biceps infra and supraspinatus muscles.  The triceps muscle showed chronic neurogenic changes.  The extensor digitorum adductor pollicis and first dorsal interosseous muscles were normal.  Paraspinal muscles showed no spontaneous potentials.    Impression:    This is an abnormal study of the right upper extremity.  There is evidence of neurogenic change with both acute and chronic changes in the distribution of the upper trunk of the brachial plexus and/or the C5-C6 myotome.  Correlation with MRI of the cervical spine and  brachial plexus is recommended.    Electronically signed by :    Joseph Seipel, M.D.  May 23, 2022

## 2022-05-16 ENCOUNTER — OFFICE VISIT (OUTPATIENT)
Dept: WOUND CARE | Facility: HOSPITAL | Age: 42
End: 2022-05-16

## 2022-05-16 PROCEDURE — G0463 HOSPITAL OUTPT CLINIC VISIT: HCPCS

## 2022-05-18 ENCOUNTER — PROCEDURE VISIT (OUTPATIENT)
Dept: NEUROLOGY | Facility: CLINIC | Age: 42
End: 2022-05-18

## 2022-05-18 VITALS
HEIGHT: 73 IN | WEIGHT: 188 LBS | HEART RATE: 79 BPM | TEMPERATURE: 98.2 F | SYSTOLIC BLOOD PRESSURE: 148 MMHG | BODY MASS INDEX: 24.92 KG/M2 | DIASTOLIC BLOOD PRESSURE: 87 MMHG

## 2022-05-18 DIAGNOSIS — R29.898 WEAKNESS OF RIGHT ARM: ICD-10-CM

## 2022-05-18 DIAGNOSIS — G56.01 CARPAL TUNNEL SYNDROME OF RIGHT WRIST: Primary | ICD-10-CM

## 2022-05-18 PROCEDURE — 95886 MUSC TEST DONE W/N TEST COMP: CPT | Performed by: PSYCHIATRY & NEUROLOGY

## 2022-05-18 PROCEDURE — 95908 NRV CNDJ TST 3-4 STUDIES: CPT | Performed by: PSYCHIATRY & NEUROLOGY

## 2022-05-18 RX ORDER — METHOCARBAMOL 500 MG/1
TABLET, FILM COATED ORAL EVERY 8 HOURS SCHEDULED
COMMUNITY

## 2022-05-18 RX ORDER — INSULIN LISPRO 100 U/ML
INJECTION, SOLUTION SUBCUTANEOUS
COMMUNITY
Start: 2022-05-02

## 2022-05-18 RX ORDER — ATORVASTATIN CALCIUM 20 MG/1
TABLET, FILM COATED ORAL
COMMUNITY

## 2022-05-18 RX ORDER — PEN NEEDLE, DIABETIC 32GX 5/32"
NEEDLE, DISPOSABLE MISCELLANEOUS
COMMUNITY
Start: 2022-04-28

## 2022-05-23 PROBLEM — M54.2 NECK PAIN: Status: ACTIVE | Noted: 2018-12-14

## 2022-05-23 PROBLEM — E78.2 MIXED HYPERLIPIDEMIA: Status: ACTIVE | Noted: 2018-12-14

## 2022-06-06 ENCOUNTER — OFFICE VISIT (OUTPATIENT)
Dept: WOUND CARE | Facility: HOSPITAL | Age: 42
End: 2022-06-06

## 2022-06-06 PROCEDURE — G0463 HOSPITAL OUTPT CLINIC VISIT: HCPCS

## 2022-06-13 ENCOUNTER — OFFICE VISIT (OUTPATIENT)
Dept: WOUND CARE | Facility: HOSPITAL | Age: 42
End: 2022-06-13

## 2022-06-13 PROCEDURE — 17250 CHEM CAUT OF GRANLTJ TISSUE: CPT

## 2022-06-28 ENCOUNTER — OFFICE VISIT (OUTPATIENT)
Dept: WOUND CARE | Facility: HOSPITAL | Age: 42
End: 2022-06-28

## 2022-06-28 PROCEDURE — G0463 HOSPITAL OUTPT CLINIC VISIT: HCPCS

## 2022-07-12 ENCOUNTER — OFFICE VISIT (OUTPATIENT)
Dept: WOUND CARE | Facility: HOSPITAL | Age: 42
End: 2022-07-12

## 2022-07-12 PROCEDURE — G0463 HOSPITAL OUTPT CLINIC VISIT: HCPCS

## 2022-07-26 ENCOUNTER — OFFICE VISIT (OUTPATIENT)
Dept: WOUND CARE | Facility: HOSPITAL | Age: 42
End: 2022-07-26

## 2022-07-26 PROCEDURE — G0463 HOSPITAL OUTPT CLINIC VISIT: HCPCS

## 2022-08-09 ENCOUNTER — OFFICE VISIT (OUTPATIENT)
Dept: WOUND CARE | Facility: HOSPITAL | Age: 42
End: 2022-08-09

## 2022-08-09 PROCEDURE — G0463 HOSPITAL OUTPT CLINIC VISIT: HCPCS

## 2022-08-23 ENCOUNTER — OFFICE VISIT (OUTPATIENT)
Dept: WOUND CARE | Facility: HOSPITAL | Age: 42
End: 2022-08-23

## 2022-08-23 PROCEDURE — G0463 HOSPITAL OUTPT CLINIC VISIT: HCPCS

## 2022-09-20 ENCOUNTER — OFFICE VISIT (OUTPATIENT)
Dept: WOUND CARE | Facility: HOSPITAL | Age: 42
End: 2022-09-20

## 2022-09-20 ENCOUNTER — LAB REQUISITION (OUTPATIENT)
Dept: LAB | Facility: HOSPITAL | Age: 42
End: 2022-09-20

## 2022-09-20 DIAGNOSIS — L89.154 PRESSURE ULCER OF SACRAL REGION, STAGE 4: ICD-10-CM

## 2022-09-20 PROCEDURE — 87077 CULTURE AEROBIC IDENTIFY: CPT | Performed by: SURGERY

## 2022-09-20 PROCEDURE — 87186 SC STD MICRODIL/AGAR DIL: CPT | Performed by: SURGERY

## 2022-09-20 PROCEDURE — 87205 SMEAR GRAM STAIN: CPT | Performed by: SURGERY

## 2022-09-20 PROCEDURE — 87070 CULTURE OTHR SPECIMN AEROBIC: CPT | Performed by: SURGERY

## 2022-09-20 PROCEDURE — G0463 HOSPITAL OUTPT CLINIC VISIT: HCPCS

## 2022-09-22 LAB
BACTERIA SPEC AEROBE CULT: ABNORMAL
BACTERIA SPEC AEROBE CULT: ABNORMAL
GRAM STN SPEC: ABNORMAL

## 2022-10-18 ENCOUNTER — OFFICE VISIT (OUTPATIENT)
Dept: WOUND CARE | Facility: HOSPITAL | Age: 42
End: 2022-10-18

## 2022-10-18 PROCEDURE — G0463 HOSPITAL OUTPT CLINIC VISIT: HCPCS

## 2022-11-15 ENCOUNTER — OFFICE VISIT (OUTPATIENT)
Dept: WOUND CARE | Facility: HOSPITAL | Age: 42
End: 2022-11-15

## 2022-11-15 PROCEDURE — G0463 HOSPITAL OUTPT CLINIC VISIT: HCPCS

## 2022-11-29 ENCOUNTER — OFFICE VISIT (OUTPATIENT)
Dept: WOUND CARE | Facility: HOSPITAL | Age: 42
End: 2022-11-29

## 2022-11-29 PROCEDURE — G0463 HOSPITAL OUTPT CLINIC VISIT: HCPCS

## 2022-12-20 ENCOUNTER — OFFICE VISIT (OUTPATIENT)
Dept: WOUND CARE | Facility: HOSPITAL | Age: 42
End: 2022-12-20

## 2022-12-20 PROCEDURE — G0463 HOSPITAL OUTPT CLINIC VISIT: HCPCS

## 2023-01-10 ENCOUNTER — APPOINTMENT (OUTPATIENT)
Dept: WOUND CARE | Facility: HOSPITAL | Age: 43
End: 2023-01-10
Payer: MEDICAID

## 2023-01-17 ENCOUNTER — OFFICE VISIT (OUTPATIENT)
Dept: WOUND CARE | Facility: HOSPITAL | Age: 43
End: 2023-01-17
Payer: MEDICAID

## 2023-01-17 PROCEDURE — G0463 HOSPITAL OUTPT CLINIC VISIT: HCPCS

## 2023-02-07 ENCOUNTER — OFFICE VISIT (OUTPATIENT)
Dept: WOUND CARE | Facility: HOSPITAL | Age: 43
End: 2023-02-07
Payer: MEDICAID

## 2023-02-07 PROCEDURE — G0463 HOSPITAL OUTPT CLINIC VISIT: HCPCS

## 2023-02-28 ENCOUNTER — OFFICE VISIT (OUTPATIENT)
Dept: WOUND CARE | Facility: HOSPITAL | Age: 43
End: 2023-02-28
Payer: MEDICAID

## 2023-02-28 PROCEDURE — G0463 HOSPITAL OUTPT CLINIC VISIT: HCPCS

## 2023-03-21 ENCOUNTER — OFFICE VISIT (OUTPATIENT)
Dept: WOUND CARE | Facility: HOSPITAL | Age: 43
End: 2023-03-21
Payer: MEDICAID

## 2023-03-21 PROCEDURE — G0463 HOSPITAL OUTPT CLINIC VISIT: HCPCS

## 2025-07-21 ENCOUNTER — APPOINTMENT (OUTPATIENT)
Dept: GENERAL RADIOLOGY | Facility: HOSPITAL | Age: 45
End: 2025-07-21
Payer: OTHER MISCELLANEOUS

## 2025-07-21 ENCOUNTER — APPOINTMENT (OUTPATIENT)
Dept: CARDIOLOGY | Facility: HOSPITAL | Age: 45
End: 2025-07-21
Payer: OTHER MISCELLANEOUS

## 2025-07-21 ENCOUNTER — HOSPITAL ENCOUNTER (EMERGENCY)
Facility: HOSPITAL | Age: 45
Discharge: HOME | End: 2025-07-21
Attending: EMERGENCY MEDICINE | Admitting: EMERGENCY MEDICINE
Payer: OTHER MISCELLANEOUS

## 2025-07-21 VITALS
HEART RATE: 78 BPM | RESPIRATION RATE: 20 BRPM | TEMPERATURE: 98.3 F | HEIGHT: 73 IN | SYSTOLIC BLOOD PRESSURE: 132 MMHG | DIASTOLIC BLOOD PRESSURE: 76 MMHG | WEIGHT: 216.49 LBS | OXYGEN SATURATION: 95 % | BODY MASS INDEX: 28.69 KG/M2

## 2025-07-21 DIAGNOSIS — L03.116 CELLULITIS OF LEFT LOWER EXTREMITY: Primary | ICD-10-CM

## 2025-07-21 LAB
ANION GAP SERPL CALCULATED.3IONS-SCNC: 10.3 MMOL/L (ref 5–15)
BASOPHILS # BLD AUTO: 0.07 10*3/MM3 (ref 0–0.2)
BASOPHILS NFR BLD AUTO: 0.8 % (ref 0–1.5)
BH CV LOWER VASCULAR LEFT COMMON FEMORAL AUGMENT: NORMAL
BH CV LOWER VASCULAR LEFT COMMON FEMORAL COMPETENT: NORMAL
BH CV LOWER VASCULAR LEFT COMMON FEMORAL COMPRESS: NORMAL
BH CV LOWER VASCULAR LEFT COMMON FEMORAL PHASIC: NORMAL
BH CV LOWER VASCULAR LEFT COMMON FEMORAL SPONT: NORMAL
BH CV LOWER VASCULAR LEFT DISTAL FEMORAL COMPRESS: NORMAL
BH CV LOWER VASCULAR LEFT GASTRONEMIUS COMPRESS: NORMAL
BH CV LOWER VASCULAR LEFT GREATER SAPH AK COMPRESS: NORMAL
BH CV LOWER VASCULAR LEFT GREATER SAPH BK COMPRESS: NORMAL
BH CV LOWER VASCULAR LEFT LESSER SAPH COMPRESS: NORMAL
BH CV LOWER VASCULAR LEFT MID FEMORAL AUGMENT: NORMAL
BH CV LOWER VASCULAR LEFT MID FEMORAL COMPETENT: NORMAL
BH CV LOWER VASCULAR LEFT MID FEMORAL COMPRESS: NORMAL
BH CV LOWER VASCULAR LEFT MID FEMORAL PHASIC: NORMAL
BH CV LOWER VASCULAR LEFT MID FEMORAL SPONT: NORMAL
BH CV LOWER VASCULAR LEFT PERONEAL COMPRESS: NORMAL
BH CV LOWER VASCULAR LEFT POPLITEAL AUGMENT: NORMAL
BH CV LOWER VASCULAR LEFT POPLITEAL COMPETENT: NORMAL
BH CV LOWER VASCULAR LEFT POPLITEAL COMPRESS: NORMAL
BH CV LOWER VASCULAR LEFT POPLITEAL PHASIC: NORMAL
BH CV LOWER VASCULAR LEFT POPLITEAL SPONT: NORMAL
BH CV LOWER VASCULAR LEFT POSTERIOR TIBIAL COMPRESS: NORMAL
BH CV LOWER VASCULAR LEFT PROFUNDA FEMORAL COMPRESS: NORMAL
BH CV LOWER VASCULAR LEFT PROXIMAL FEMORAL COMPRESS: NORMAL
BH CV LOWER VASCULAR LEFT SAPHENOFEMORAL JUNCTION COMPRESS: NORMAL
BH CV LOWER VASCULAR RIGHT COMMON FEMORAL AUGMENT: NORMAL
BH CV LOWER VASCULAR RIGHT COMMON FEMORAL COMPETENT: NORMAL
BH CV LOWER VASCULAR RIGHT COMMON FEMORAL COMPRESS: NORMAL
BH CV LOWER VASCULAR RIGHT COMMON FEMORAL PHASIC: NORMAL
BH CV LOWER VASCULAR RIGHT COMMON FEMORAL SPONT: NORMAL
BUN SERPL-MCNC: 29.8 MG/DL (ref 6–20)
BUN/CREAT SERPL: 27.6 (ref 7–25)
CALCIUM SPEC-SCNC: 9.2 MG/DL (ref 8.6–10.5)
CHLORIDE SERPL-SCNC: 101 MMOL/L (ref 98–107)
CK SERPL-CCNC: 139 U/L (ref 20–200)
CO2 SERPL-SCNC: 21.7 MMOL/L (ref 22–29)
CREAT SERPL-MCNC: 1.08 MG/DL (ref 0.76–1.27)
DEPRECATED RDW RBC AUTO: 41 FL (ref 37–54)
EGFRCR SERPLBLD CKD-EPI 2021: 86.2 ML/MIN/1.73
EOSINOPHIL # BLD AUTO: 0.26 10*3/MM3 (ref 0–0.4)
EOSINOPHIL NFR BLD AUTO: 2.9 % (ref 0.3–6.2)
ERYTHROCYTE [DISTWIDTH] IN BLOOD BY AUTOMATED COUNT: 12.3 % (ref 12.3–15.4)
GLUCOSE SERPL-MCNC: 134 MG/DL (ref 65–99)
HCT VFR BLD AUTO: 42.2 % (ref 37.5–51)
HGB BLD-MCNC: 13.9 G/DL (ref 13–17.7)
IMM GRANULOCYTES # BLD AUTO: 0.09 10*3/MM3 (ref 0–0.05)
IMM GRANULOCYTES NFR BLD AUTO: 1 % (ref 0–0.5)
LYMPHOCYTES # BLD AUTO: 2.15 10*3/MM3 (ref 0.7–3.1)
LYMPHOCYTES NFR BLD AUTO: 24.4 % (ref 19.6–45.3)
MCH RBC QN AUTO: 29.6 PG (ref 26.6–33)
MCHC RBC AUTO-ENTMCNC: 32.9 G/DL (ref 31.5–35.7)
MCV RBC AUTO: 89.8 FL (ref 79–97)
MONOCYTES # BLD AUTO: 0.72 10*3/MM3 (ref 0.1–0.9)
MONOCYTES NFR BLD AUTO: 8.2 % (ref 5–12)
NEUTROPHILS NFR BLD AUTO: 5.53 10*3/MM3 (ref 1.7–7)
NEUTROPHILS NFR BLD AUTO: 62.7 % (ref 42.7–76)
NRBC BLD AUTO-RTO: 0 /100 WBC (ref 0–0.2)
PLATELET # BLD AUTO: 220 10*3/MM3 (ref 140–450)
PMV BLD AUTO: 10.2 FL (ref 6–12)
POTASSIUM SERPL-SCNC: 4 MMOL/L (ref 3.5–5.2)
RBC # BLD AUTO: 4.7 10*6/MM3 (ref 4.14–5.8)
SODIUM SERPL-SCNC: 133 MMOL/L (ref 136–145)
WBC NRBC COR # BLD AUTO: 8.82 10*3/MM3 (ref 3.4–10.8)

## 2025-07-21 PROCEDURE — 82550 ASSAY OF CK (CPK): CPT | Performed by: EMERGENCY MEDICINE

## 2025-07-21 PROCEDURE — 85025 COMPLETE CBC W/AUTO DIFF WBC: CPT | Performed by: EMERGENCY MEDICINE

## 2025-07-21 PROCEDURE — 25010000002 AMPICILLIN-SULBACTAM PER 1.5 G: Performed by: EMERGENCY MEDICINE

## 2025-07-21 PROCEDURE — 96365 THER/PROPH/DIAG IV INF INIT: CPT

## 2025-07-21 PROCEDURE — 80048 BASIC METABOLIC PNL TOTAL CA: CPT | Performed by: EMERGENCY MEDICINE

## 2025-07-21 PROCEDURE — 25010000002 TETANUS-DIPHTH-ACELL PERTUSSIS 5-2.5-18.5 LF-MCG/0.5 SUSPENSION PREFILLED SYRINGE: Performed by: EMERGENCY MEDICINE

## 2025-07-21 PROCEDURE — 73590 X-RAY EXAM OF LOWER LEG: CPT

## 2025-07-21 PROCEDURE — 99284 EMERGENCY DEPT VISIT MOD MDM: CPT

## 2025-07-21 PROCEDURE — 90471 IMMUNIZATION ADMIN: CPT | Performed by: EMERGENCY MEDICINE

## 2025-07-21 PROCEDURE — 87040 BLOOD CULTURE FOR BACTERIA: CPT | Performed by: EMERGENCY MEDICINE

## 2025-07-21 PROCEDURE — 36415 COLL VENOUS BLD VENIPUNCTURE: CPT

## 2025-07-21 PROCEDURE — 90715 TDAP VACCINE 7 YRS/> IM: CPT | Performed by: EMERGENCY MEDICINE

## 2025-07-21 PROCEDURE — 93971 EXTREMITY STUDY: CPT

## 2025-07-21 RX ORDER — SODIUM CHLORIDE 0.9 % (FLUSH) 0.9 %
10 SYRINGE (ML) INJECTION AS NEEDED
Status: DISCONTINUED | OUTPATIENT
Start: 2025-07-21 | End: 2025-07-21 | Stop reason: HOSPADM

## 2025-07-21 RX ADMIN — AMPICILLIN SODIUM AND SULBACTAM SODIUM 3 G: 2; 1 INJECTION, POWDER, FOR SOLUTION INTRAMUSCULAR; INTRAVENOUS at 19:17

## 2025-07-21 RX ADMIN — TETANUS TOXOID, REDUCED DIPHTHERIA TOXOID AND ACELLULAR PERTUSSIS VACCINE, ADSORBED 0.5 ML: 5; 2.5; 8; 8; 2.5 SUSPENSION INTRAMUSCULAR at 18:51

## 2025-07-21 NOTE — DISCHARGE INSTRUCTIONS
Keep the wound clean with soap and water.  Augmentin sent to your pharmacy.  Take probiotics while on the antibiotics.  Elevate leg.  Follow-up primary care doctor 2 days for recheck.  Return for increasing redness swelling pain drainage foul odor no improvement next couple days or any other new or worse problems or concerns return immediately to the ER for

## 2025-07-21 NOTE — ED PROVIDER NOTES
Subjective   History of Present Illness  Chief complaint wound injury to the left lower leg    History of present illness this is a 45-year-old gentleman states that he noticed left leg last Wednesday he went to step up into a vehicle at work his leg slipped and went into the frame he sustained a laceration to that shin.  He states has been treating it symptomatically but now its come red and swollen.  He denies any fever chills or sweats he has some mild pain.  Not current.  He denies any chest pain or shortness of breath.  Patient has been able to walk on it before today.  He has not been on antibiotics.      Review of Systems   Constitutional:  Negative for chills, diaphoresis and fever.   Respiratory:  Negative for chest tightness and shortness of breath.    Gastrointestinal:  Negative for vomiting.   Skin:  Positive for wound.       Past Medical History:   Diagnosis Date    Diabetes mellitus        No Known Allergies    Past Surgical History:   Procedure Laterality Date    APPENDECTOMY      CARPAL TUNNEL RELEASE      HAND SURGERY Left 2000    WISDOM TOOTH EXTRACTION      WOUND DEBRIDEMENT N/A 01/12/2022    Procedure: DEBRIDEMENT WOUND, decubitus;  Surgeon: Lacho Varghese MD;  Location: [facility] OR;  Service: General;  Laterality: N/A;       No family history on file.    Social History     Socioeconomic History    Marital status:    Tobacco Use    Smoking status: Never     Passive exposure: Never    Smokeless tobacco: Current     Types: Chew   Vaping Use    Vaping status: Never Used   Substance and Sexual Activity    Alcohol use: Yes    Drug use: Never    Sexual activity: Defer       Prior to Admission medications    Medication Sig Start Date End Date Taking? Authorizing Provider   amLODIPine (NORVASC) 10 MG tablet  8/13/24   Provider, MD Carrol   amoxicillin-clavulanate (AUGMENTIN) 875-125 MG per tablet Take 1 tablet by mouth 2 (Two) Times a Day. 7/21/25   Chano Sanchez MD   BD Pen Needle  Merry 2nd Gen 32G X 4 MM misc USE 1 EACH 4 TIMES DAILY FOR INSULIN ADMINISTRATION 4/28/22   Carrol Hunter MD   Insulin Lispro (ADMELOG SOLOSTAR) 100 UNIT/ML injection pen INJECT 8 UNITS SUBCUTANEOUSLY ONCE DAILY BEFORE MEAL(S) 5/2/22   Carrol Hunter MD   Lantus SoloStar 100 UNIT/ML injection pen Inject 35 unit subcutaneously once a day 8/11/24   Carrol Hunter MD   losartan (COZAAR) 100 MG tablet Take 1 tablet by mouth Daily. 8/7/24   Carrol Hunter MD   methylPREDNISolone (MEDROL) 4 MG dose pack Take as directed on package instructions. 8/16/24   May, MANDA Mac        Objective   Physical Exam  Constitutional is a 45-year-old gentleman awake alert no acute distress triage vital signs reviewed.  HEENT unremarkable lungs are clear no retraction heart regular without murmur and with soft back tenderness examination that left leg patient has a healing wound to the anterior shin on the left lower leg.  There are some mild surrounding erythema to the wound and some satellite lesions distally of some localized petechiae.  No purpura.  There is no crepitus there is no subcutaneous there is no fluctuance minimal pain no purulent drainage or foul odor.  Toes unoccluded big toe dorsiflex plantarflex at difficulty there is mild swelling the leg but no cords or Homans' sign or evidence of DVT.  No inguinal nodes.  The patient has no pain on proportion exam or pain with passive stretching the compartments are soft to palpation.  Patient has good and equal dorsalis pedis and posterior tibialis pulses good cap refill feet are warm and dry.  Neurologic he is awake alert and follows commands motion EXTR without focal weakness  Procedures           ED Course      Results for orders placed or performed during the hospital encounter of 07/21/25   Duplex Venous Lower Extremity - LEFT    Collection Time: 07/21/25  6:07 PM   Result Value Ref Range    Right Common Femoral Spont Y     Right Common Femoral  Competent Y     Right Common Femoral Phasic Y     Right Common Femoral Compress C     Right Common Femoral Augment Y     Left Common Femoral Spont Y     Left Common Femoral Competent Y     Left Common Femoral Phasic Y     Left Common Femoral Compress C     Left Common Femoral Augment Y     Left Saphenofemoral Junction Compress C     Left Proximal Femoral Compress C     Left Mid Femoral Spont Y     Left Mid Femoral Competent Y     Left Mid Femoral Phasic Y     Left Mid Femoral Compress C     Left Mid Femoral Augment Y     Left Distal Femoral Compress C     Left Popliteal Spont Y     Left Popliteal Competent Y     Left Popliteal Phasic Y     Left Popliteal Compress C     Left Popliteal Augment Y     Left Posterior Tibial Compress C     Left Peroneal Compress C     Left Gastronemius Compress C     Left Greater Saph AK Compress C     Left Greater Saph BK Compress C     Left Lesser Saph Compress C     Left Profunda Femoral Compress C    Basic Metabolic Panel    Collection Time: 07/21/25  6:22 PM    Specimen: Arm, Left; Blood   Result Value Ref Range    Glucose 134 (H) 65 - 99 mg/dL    BUN 29.8 (H) 6.0 - 20.0 mg/dL    Creatinine 1.08 0.76 - 1.27 mg/dL    Sodium 133 (L) 136 - 145 mmol/L    Potassium 4.0 3.5 - 5.2 mmol/L    Chloride 101 98 - 107 mmol/L    CO2 21.7 (L) 22.0 - 29.0 mmol/L    Calcium 9.2 8.6 - 10.5 mg/dL    BUN/Creatinine Ratio 27.6 (H) 7.0 - 25.0    Anion Gap 10.3 5.0 - 15.0 mmol/L    eGFR 86.2 >60.0 mL/min/1.73   CK    Collection Time: 07/21/25  6:22 PM    Specimen: Arm, Left; Blood   Result Value Ref Range    Creatine Kinase 139 20 - 200 U/L   CBC Auto Differential    Collection Time: 07/21/25  6:22 PM    Specimen: Arm, Left; Blood   Result Value Ref Range    WBC 8.82 3.40 - 10.80 10*3/mm3    RBC 4.70 4.14 - 5.80 10*6/mm3    Hemoglobin 13.9 13.0 - 17.7 g/dL    Hematocrit 42.2 37.5 - 51.0 %    MCV 89.8 79.0 - 97.0 fL    MCH 29.6 26.6 - 33.0 pg    MCHC 32.9 31.5 - 35.7 g/dL    RDW 12.3 12.3 - 15.4 %    RDW-SD  41.0 37.0 - 54.0 fl    MPV 10.2 6.0 - 12.0 fL    Platelets 220 140 - 450 10*3/mm3    Neutrophil % 62.7 42.7 - 76.0 %    Lymphocyte % 24.4 19.6 - 45.3 %    Monocyte % 8.2 5.0 - 12.0 %    Eosinophil % 2.9 0.3 - 6.2 %    Basophil % 0.8 0.0 - 1.5 %    Immature Grans % 1.0 (H) 0.0 - 0.5 %    Neutrophils, Absolute 5.53 1.70 - 7.00 10*3/mm3    Lymphocytes, Absolute 2.15 0.70 - 3.10 10*3/mm3    Monocytes, Absolute 0.72 0.10 - 0.90 10*3/mm3    Eosinophils, Absolute 0.26 0.00 - 0.40 10*3/mm3    Basophils, Absolute 0.07 0.00 - 0.20 10*3/mm3    Immature Grans, Absolute 0.09 (H) 0.00 - 0.05 10*3/mm3    nRBC 0.0 0.0 - 0.2 /100 WBC     XR Tibia Fibula 2 View Left  Result Date: 7/21/2025  Impression: No acute osseous findings. Electronically Signed: Leonard Mccullough MD  7/21/2025 7:29 PM EDT  Workstation ID: [id number]    Medications   sodium chloride 0.9 % flush 10 mL (has no administration in time range)   sodium chloride 0.9 % flush 10 mL (has no administration in time range)   Tetanus-Diphth-Acell Pertussis (BOOSTRIX) injection 0.5 mL (0.5 mL Intramuscular Given 7/21/25 1851)   ampicillin-sulbactam (UNASYN) 3 g in sodium chloride 0.9 % 100 mL MBP (3 g Intravenous New Bag 7/21/25 1917)                                                        Medical Decision Making  Medical Decision Making IV established tetanus was updated.  X-ray of the tib-fib my independent review no fracture foreign bodies or acute abnormalities radiology reviewed the same.  Ultrasound leg obtained report reviewed by me shows no evidence of DVT or SVT.  Labs obtained by PMD reviewed basic metabolic profile unremarkable other than a blood sugar 134 CK normal CBC normal cultures pending.  Patient was given 3 g of Unasyn IV.  Patient exam is resting comfortably.  I do not see any evidence of a DVT or SVT or compartment syndrome I do not see evidence of necrotizing fasciitis I do not see any evidence that suggest an abscess either superficial or deep I do not see  any evidence that suggest sepsis or bacteremia based on my history and physical clinical findings.  Patient has some mild swelling.  He has no other particular purpose is entire body just localized around his wound.  Mild surrounding erythema.  We talked about outpatient inpatient but patient wants to go home.  I think this is reasonable is not been on antibiotics yet he has a mild cellulitis at this point although he is a diabetic we discussed this how rapidly these can progress although it has been there since last Wednesday.  I do not see notes just a foreign body.  He is placed on Augmentin discharged home stable otherwise unremarkable course we talked about what to return for and he voiced understanding    Problems Addressed:  Cellulitis of left lower extremity: complicated acute illness or injury    Amount and/or Complexity of Data Reviewed  Labs: ordered. Decision-making details documented in ED Course.  Radiology: ordered and independent interpretation performed. Decision-making details documented in ED Course.    Risk  Prescription drug management.        Final diagnoses:   Cellulitis of left lower extremity       ED Disposition  ED Disposition       ED Disposition   Discharge    Condition   Stable    Comment   --               Mali Gustafson, APRN  [address] [facility] [place] [address] [phone]    In 2 days           Medication List        New Prescriptions      amoxicillin-clavulanate 875-125 MG per tablet  Commonly known as: AUGMENTIN  Take 1 tablet by mouth 2 (Two) Times a Day.               Where to Get Your Medications        These medications were sent to [id number] - [place], [place] - [address] - [phone]  - [phone] FX  [address] [facility] [place] [address]      Phone: [phone]   amoxicillin-clavulanate 875-125 MG per tablet            Chano Sanchez MD  07/22/25 8699

## 2025-07-26 LAB
BACTERIA SPEC AEROBE CULT: NORMAL
BACTERIA SPEC AEROBE CULT: NORMAL

## (undated) DEVICE — SPNG LAP PREWSH SFTPK 18X18IN STRL PK/5

## (undated) DEVICE — PK MINOR LAPAROTOMY 50

## (undated) DEVICE — DRSNG WND SIL OPTIFOAM GENTLE BRDR ADHS W/SA 6X6IN

## (undated) DEVICE — BANDAGE,GAUZE,BULKEE II,4.5"X4.1YD,STRL: Brand: MEDLINE

## (undated) DEVICE — DRSNG WND GZ CURAD OIL EMULSION 3X8IN LF STRL 1PK

## (undated) DEVICE — SOL IRRIG NACL 1000ML

## (undated) DEVICE — SOLUTION,WATER,IRRIGATION,1000ML,STERILE: Brand: MEDLINE

## (undated) DEVICE — WET SKIN PREP TRAY: Brand: MEDLINE INDUSTRIES, INC.

## (undated) DEVICE — GLV SURG BIOGEL LTX PF 7

## (undated) DEVICE — KT SURG TURNOVER 050

## (undated) DEVICE — UNDERGLV SURG BIOGEL INDICATOR LTX PF 7